# Patient Record
Sex: MALE | Employment: STUDENT | URBAN - METROPOLITAN AREA
[De-identification: names, ages, dates, MRNs, and addresses within clinical notes are randomized per-mention and may not be internally consistent; named-entity substitution may affect disease eponyms.]

---

## 2017-04-08 ENCOUNTER — HOSPITAL ENCOUNTER (EMERGENCY)
Facility: CLINIC | Age: 22
Discharge: HOME | End: 2017-04-08
Attending: FAMILY MEDICINE | Admitting: FAMILY MEDICINE
Payer: COMMERCIAL

## 2017-04-08 ENCOUNTER — TELEPHONE (OUTPATIENT)
Dept: NURSING | Facility: CLINIC | Age: 22
End: 2017-04-08

## 2017-04-08 ENCOUNTER — APPOINTMENT (OUTPATIENT)
Dept: GENERAL RADIOLOGY | Facility: CLINIC | Age: 22
End: 2017-04-08
Attending: FAMILY MEDICINE
Payer: COMMERCIAL

## 2017-04-08 VITALS
DIASTOLIC BLOOD PRESSURE: 69 MMHG | OXYGEN SATURATION: 100 % | HEIGHT: 72 IN | TEMPERATURE: 98.5 F | HEART RATE: 66 BPM | RESPIRATION RATE: 17 BRPM | SYSTOLIC BLOOD PRESSURE: 112 MMHG

## 2017-04-08 DIAGNOSIS — S61.258A CAT BITE OF MIDDLE FINGER, INITIAL ENCOUNTER: ICD-10-CM
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
## 2017-04-08 LAB
BASOPHILS # BLD AUTO: 0 10E9/L (ref 0–0.2)
BASOPHILS NFR BLD AUTO: 0.5 %
CRP SERPL-MCNC: 2.9 MG/L (ref 0–8)
DIFFERENTIAL METHOD BLD: NORMAL
EOSINOPHIL # BLD AUTO: 0.1 10E9/L (ref 0–0.7)
EOSINOPHIL NFR BLD AUTO: 1.2 %
ERYTHROCYTE [DISTWIDTH] IN BLOOD BY AUTOMATED COUNT: 12.2 % (ref 10–15)
HCT VFR BLD AUTO: 44.1 % (ref 40–53)
HGB BLD-MCNC: 15.2 G/DL (ref 13.3–17.7)
IMM GRANULOCYTES # BLD: 0 10E9/L (ref 0–0.4)
IMM GRANULOCYTES NFR BLD: 0.1 %
LYMPHOCYTES # BLD AUTO: 2.7 10E9/L (ref 0.8–5.3)
LYMPHOCYTES NFR BLD AUTO: 36.8 %
MCH RBC QN AUTO: 30.8 PG (ref 26.5–33)
MCHC RBC AUTO-ENTMCNC: 34.5 G/DL (ref 31.5–36.5)
MCV RBC AUTO: 90 FL (ref 78–100)
MONOCYTES # BLD AUTO: 0.6 10E9/L (ref 0–1.3)
MONOCYTES NFR BLD AUTO: 8.7 %
NEUTROPHILS # BLD AUTO: 3.9 10E9/L (ref 1.6–8.3)
NEUTROPHILS NFR BLD AUTO: 52.7 %
NRBC # BLD AUTO: 0 10*3/UL
NRBC BLD AUTO-RTO: 0 /100
PLATELET # BLD AUTO: 226 10E9/L (ref 150–450)
RBC # BLD AUTO: 4.93 10E12/L (ref 4.4–5.9)
WBC # BLD AUTO: 7.3 10E9/L (ref 4–11)

## 2017-04-08 PROCEDURE — 96365 THER/PROPH/DIAG IV INF INIT: CPT | Performed by: FAMILY MEDICINE

## 2017-04-08 PROCEDURE — 73140 X-RAY EXAM OF FINGER(S): CPT | Mod: LT

## 2017-04-08 PROCEDURE — 85025 COMPLETE CBC W/AUTO DIFF WBC: CPT | Performed by: FAMILY MEDICINE

## 2017-04-08 PROCEDURE — 99284 EMERGENCY DEPT VISIT MOD MDM: CPT | Mod: 25 | Performed by: FAMILY MEDICINE

## 2017-04-08 PROCEDURE — 25000125 ZZHC RX 250: Performed by: FAMILY MEDICINE

## 2017-04-08 PROCEDURE — 99284 EMERGENCY DEPT VISIT MOD MDM: CPT | Mod: Z6 | Performed by: FAMILY MEDICINE

## 2017-04-08 PROCEDURE — 86140 C-REACTIVE PROTEIN: CPT | Performed by: FAMILY MEDICINE

## 2017-04-08 RX ORDER — AMPICILLIN AND SULBACTAM 2; 1 G/1; G/1
3 INJECTION, POWDER, FOR SOLUTION INTRAMUSCULAR; INTRAVENOUS ONCE
Status: COMPLETED | OUTPATIENT
Start: 2017-04-08 | End: 2017-04-08

## 2017-04-08 RX ADMIN — AMPICILLIN SODIUM AND SULBACTAM SODIUM 3 G: 2; 1 INJECTION, POWDER, FOR SOLUTION INTRAMUSCULAR; INTRAVENOUS at 22:33

## 2017-04-08 ASSESSMENT — ENCOUNTER SYMPTOMS
WOUND: 1
FEVER: 0
SHORTNESS OF BREATH: 0

## 2017-04-08 NOTE — ED AVS SNAPSHOT
[facility], [place], Emergency Department    [address] [place] [id number]    Phone:  [phone]                                       Neli Culp   MRN: [id number]    Department:  [facility], Emergency Department   Date of Visit:  4/8/2017           After Visit Summary Signature Page     I have received my discharge instructions, and my questions have been answered. I have discussed any challenges I see with this plan with the nurse or doctor.    ..........................................................................................................................................  Patient/Patient Representative Signature      ..........................................................................................................................................  Patient Representative Print Name and Relationship to Patient    ..................................................               ................................................  Date                                            Time    ..........................................................................................................................................  Reviewed by Signature/Title    ...................................................              ..............................................  Date                                                            Time

## 2017-04-08 NOTE — ED AVS SNAPSHOT
[facility], Emergency Department    [address] [facility] [address]    Phone:  [phone]                                       Neil Culp   MRN: [id number]    Department:  [facility], Emergency Department   Date of Visit:  4/8/2017           Patient Information     Date Of Birth          1995        Your diagnoses for this visit were:     Cat bite of middle finger, initial encounter        You were seen by Kong Humphrey MD.      Follow-up Information     Follow up with Ambrose Byers MD.    Specialty:  Internal Medicine    Contact information:     PHYSICIANS  [address] [facility] [address]  [facility] [id number] [phone]          Discharge Instructions       Home.  Your xray and labs look OK.  Take the augmentin twice a day for 10 days for infection.  See MD for a recheck if not improving or return to the ER if increase swelling and pain or fever or any concerns.      Cat Bite    A cat bite can cause a wound deep enough to break the skin. In such cases, the wound is cleaned and then closed. Sometimes, the wound is not closed completely. This is so that fluid can drain if the wound becomes infected. In addition to wound care, a tetanus shot may be given, if needed.  Home Care    Wash your hands well with soap and warm water before and after caring for the wound. This helps lower the risk of infection.    Care for the wound as directed. If a dressing was applied to the wound, be sure to change it as directed.    If the wound bleeds, place a clean, soft cloth on the wound. Then firmly apply pressure until the bleeding stops. This may take up to 5 minutes. Do not release the pressure and look at the wound during this time.    Most wounds heal within 10 days. But an infection can occur even with proper treatment. So be sure to check the wound daily for signs of infection (see below).    Antibiotics may be prescribed. These help prevent or treat infection. If you re given  antibiotics, take them as directed. Also be sure to complete the medications.  Rabies Prevention   Rabies is a virus that can be carried in certain animals. These can include domestic animals such as cats and dogs. Pets fully vaccinated against rabies (2 shots) are at very low risk of infection. But because human rabies is almost always fatal, any biting pet should be confined for 10 days as an extra precaution. In general, if there is a risk for rabies, the following steps may need to be taken:    If someone s pet cat has bitten you, it should be kept in a secure area for the next 10 days to watch for signs of illness. (If the pet owner won t allow this, contact your local animal control center.) If the cat becomes ill or dies during that time, contact your local animal control center at once so the animal may be tested for rabies. If the cat stays healthy for the next 10 days, there is no danger of rabies in the animal or you.    If a stray cat bit you, contact your local animal control center. They can give information on capture, quarantine, and animal rabies testing.    If you can t locate the animal that bit you in the next 2 days, and if rabies exists in your region, you may need to receive the rabies vaccine series. Call your health care provider right away. Or, return to the emergency department promptly.    All animal bites should be reported to the local animal control center. If you were not given a form to fill out, you can report this yourself.  Follow-up care  Follow up with your health care provider, or as directed.  When to seek medical advice  Call your health care provider right away if any of these occur:    Signs of infection:    Spreading redness or warmth from the wound    Increased pain or swelling    Fever of 100.4 F (38 C) or higher, or as directed by your health care provider    Colored fluid or pus draining from the wound    Signs of rabies infection:    Headache    Confusion    Strange  behavior    Seizure    Decreased ability to move any body part near the bite area    Bleeding that cannot be stopped after 5 minutes of firm pressure              6297-7793 [employer]. [address] [place] [address], [place] [address]. All rights reserved. This information is not intended as a substitute for professional medical care. Always follow your healthcare professional's instructions.          24 Hour Appointment Hotline       To make an appointment at any [facility], call [phone] ([phone]). If you don't have a family doctor or clinic, we will help you find one. [facility] clinics are conveniently located to serve the needs of you and your family.             Review of your medicines      START taking        Dose / Directions Last dose taken    amoxicillin-clavulanate 875-125 MG per tablet   Commonly known as:  AUGMENTIN   Dose:  1 tablet   Quantity:  20 tablet        Take 1 tablet by mouth 2 times daily for 10 days   Refills:  0          Our records show that you are taking the medicines listed below. If these are incorrect, please call your family doctor or clinic.        Dose / Directions Last dose taken    clindamycin-benzoyl peroxide gel   Commonly known as:  BENZACLIN   Quantity:  50 g        Apply topically daily   Refills:  11        * tretinoin 0.025 % cream   Commonly known as:  RETIN-A   Quantity:  45 g        Apply to entire face at bedtime. Skip to every other night if too irritating.   Refills:  11        * tretinoin 0.05 % Gel   Dose:  0.5 inch   Quantity:  45 g        Apply 0.5 inches topically At Bedtime Spread a pea size amount into affected area.  Use sunscreen SPF>20.   Refills:  11        valACYclovir 500 MG tablet   Commonly known as:  VALTREX   Dose:  500 mg   Quantity:  90 tablet        Take 1 tablet (500 mg) by mouth daily   Refills:  3        * Notice:  This list has 2 medication(s) that are the same as other medications prescribed for you. Read the  directions carefully, and ask your doctor or other care provider to review them with you.            Prescriptions were sent or printed at these locations (1 Prescription)                   Other Prescriptions                Printed at Department/Unit printer (1 of 1)         amoxicillin-clavulanate (AUGMENTIN) 875-125 MG per tablet                Procedures and tests performed during your visit     CBC with platelets differential    CRP inflammation    Fingers XR, 2-3 views, left      Orders Needing Specimen Collection     None      Pending Results     Date and Time Order Name Status Description    4/8/2017 2211 Fingers XR, 2-3 views, left Preliminary             Pending Culture Results     No orders found from 4/6/2017 to 4/9/2017.            Thank you for choosing [facility]       Thank you for choosing [facility] for your care. Our goal is always to provide you with excellent care. Hearing back from our patients is one way we can continue to improve our services. Please take a few minutes to complete the written survey that you may receive in the mail after you visit with us. Thank you!        [employer] Information     [employer] gives you secure access to your electronic health record. If you see a primary care provider, you can also send messages to your care team and make appointments. If you have questions, please call your primary care clinic.  If you do not have a primary care provider, please call [phone] and they will assist you.        Care EveryWhere ID     This is your Care EveryWhere ID. This could be used by other organizations to access your [facility] medical records  [id number]        After Visit Summary       This is your record. Keep this with you and show to your community pharmacist(s) and doctor(s) at your next visit.

## 2017-04-09 ASSESSMENT — ENCOUNTER SYMPTOMS
NUMBNESS: 0
DYSPHORIC MOOD: 0
DECREASED CONCENTRATION: 0
CHILLS: 0
ACTIVITY CHANGE: 1
ABDOMINAL PAIN: 0
COLOR CHANGE: 0
CONFUSION: 0
NECK STIFFNESS: 0
ARTHRALGIAS: 0
WEAKNESS: 0
HEADACHES: 0
EYE REDNESS: 0
DIFFICULTY URINATING: 0

## 2017-04-09 NOTE — ED PROVIDER NOTES
History     Chief Complaint   Patient presents with     Cat Bite     Hand Pain     left hand middle finger     HPI  Neil Culp is a 22-year-old, right-handed male who presents for evaluation of finger swelling after cat bite. The patient reports that he was bit and scratched in the left hand middle finger by his parents' young cat about 1 week ago. He states that he had some swelling in the area of the wound, but this improved. However, he the swelling returned and has persisted, so he presents now for evaluation. He states that he did have intense pain in the area of the wound yesterday, but today his pain is significantly abated. No fevers or other symptoms associated. He reports that the cat's vaccines are up-to-date.  Denies fevers red streaking of the arm.  Patient still able to move the finger.  No numbness distally.  Patient tetanus is up-to-date.  cats immunizations are up-to-date also    I have reviewed the Medications, Allergies, Past Medical and Surgical History, and Social History in the Epic system.    No current facility-administered medications for this encounter.      Current Outpatient Prescriptions   Medication     amoxicillin-clavulanate (AUGMENTIN) 875-125 MG per tablet     clindamycin-benzoyl peroxide (BENZACLIN) gel     valACYclovir (VALTREX) 500 MG tablet     tretinoin 0.05 % GEL     tretinoin (RETIN-A) 0.025 % cream     History reviewed. No pertinent past medical history.    Past Surgical History:   Procedure Laterality Date     wisdom teeth          Family History   Problem Relation Age of Onset     Prostate Cancer Maternal Grandfather      Breast Cancer Mother      DIABETES Maternal Grandmother      Hypertension Maternal Grandmother      Psychotic Disorder Father      Bipolar     Retinal detachment Brother      possible     Glaucoma No family hx of      Macular Degeneration No family hx of      CANCER No family hx of      No family history of skin cancer     Melanoma  No family hx of      Skin Cancer No family hx of        Social History   Substance Use Topics     Smoking status: Never Smoker     Smokeless tobacco: Never Used     Alcohol use No      No Known Allergies    Review of Systems   Constitutional: Positive for activity change (slightly decreased range of motion left middle finger patient right hand dominant). Negative for chills and fever.   HENT: Negative for congestion.    Eyes: Negative for redness.   Respiratory: Negative for shortness of breath.    Cardiovascular: Negative for chest pain.   Gastrointestinal: Negative for abdominal pain.   Genitourinary: Negative for difficulty urinating.   Musculoskeletal: Negative for arthralgias and neck stiffness.   Skin: Positive for rash and wound (cat bite and scratch to left hand middle finger). Negative for color change.        Left middle finger with some mild swelling noted with minimal hyperemia and no lymphogenic spread no crepitus noted.  No active findings for acute flexor teno- synovitis.   Neurological: Negative for weakness, numbness and headaches.   Psychiatric/Behavioral: Negative for confusion, decreased concentration and dysphoric mood.   All other systems reviewed and are negative.      Physical Exam   BP: 113/70  Heart Rate: 66  Temp: 98.5  F (36.9  C)  Resp: 17  Height: 182.9 cm (6')  SpO2: 98 %  Physical Exam   Constitutional: He is oriented to person, place, and time. He appears well-developed and well-nourished. He appears distressed.   Minimal distress nontoxic   HENT:   Head: Normocephalic and atraumatic.   Eyes: Pupils are equal, round, and reactive to light. No scleral icterus.   Neck: Normal range of motion. Neck supple.   Cardiovascular: Regular rhythm.    Pulmonary/Chest: No stridor. No respiratory distress.   Abdominal: There is no guarding.   Musculoskeletal: He exhibits edema and tenderness. He exhibits no deformity.        Hands:  Patient's left hand there is some mild swelling without  lymphangitic spread or marked hyperemia no desquamation noted.  Patient has minimal pain with passive extension patient is able to fully flex the fist/fingers without difficulty.   Neurological: He is alert and oriented to person, place, and time. He has normal reflexes. No cranial nerve deficit. Coordination normal.   Skin: Skin is warm and dry. No rash noted. He is not diaphoretic. No erythema. No pallor.   Psychiatric: He has a normal mood and affect. His behavior is normal. Judgment and thought content normal.   Nursing note and vitals reviewed.      ED Course     ED Course     Procedures         In ER patient labs done CBC CMP which were normal.  3 g Unasyn IV given.  X-rays done of the left middle finger reveal no foreign body some soft tissue swelling but no air in the subcutaneous tissue.    Reassess patient patient is nontoxic otherwise cooperative was to go home patient is discharged on Augmentin for 10 days monitoring signs of worsening swelling fever should return immediately patient agrees plan at discharge.    Critical Care time:  none               Labs Ordered and Resulted from Time of ED Arrival Up to the Time of Departure from the ED   CBC WITH PLATELETS DIFFERENTIAL   CRP INFLAMMATION     Results for orders placed or performed during the hospital encounter of 04/08/17   Fingers XR, 2-3 views, left    Narrative         Impression    Impression: Soft tissue swelling about the left third digit. No  radiodense foreign body.   CBC with platelets differential   Result Value Ref Range    WBC 7.3 4.0 - 11.0 10e9/L    RBC Count 4.93 4.4 - 5.9 10e12/L    Hemoglobin 15.2 13.3 - 17.7 g/dL    Hematocrit 44.1 40.0 - 53.0 %    MCV 90 78 - 100 fl    MCH 30.8 26.5 - 33.0 pg    MCHC 34.5 31.5 - 36.5 g/dL    RDW 12.2 10.0 - 15.0 %    Platelet Count 226 150 - 450 10e9/L    Diff Method Automated Method     % Neutrophils 52.7 %    % Lymphocytes 36.8 %    % Monocytes 8.7 %    % Eosinophils 1.2 %    % Basophils 0.5 %     % Immature Granulocytes 0.1 %    Nucleated RBCs 0 0 /100    Absolute Neutrophil 3.9 1.6 - 8.3 10e9/L    Absolute Lymphocytes 2.7 0.8 - 5.3 10e9/L    Absolute Monocytes 0.6 0.0 - 1.3 10e9/L    Absolute Eosinophils 0.1 0.0 - 0.7 10e9/L    Absolute Basophils 0.0 0.0 - 0.2 10e9/L    Abs Immature Granulocytes 0.0 0 - 0.4 10e9/L    Absolute Nucleated RBC 0.0    CRP inflammation   Result Value Ref Range    CRP Inflammation 2.9 0.0 - 8.0 mg/L       Assessments & Plan (with Medical Decision Making)  22-year-old male with left middle finger cat bite a week ago now some increasing swelling noted.  Mild swelling that is decreased since last night no fever labs are normal x-ray negative for foreign body or subcu air.  Patient has no obvious signs of active flexor tenosynovitis.  Patient treated with Unasyn IV discharged on Augmentin          I have reviewed the nursing notes.    I have reviewed the findings, diagnosis, plan and need for follow up with the patient.    Discharge Medication List as of 4/8/2017 11:44 PM      START taking these medications    Details   amoxicillin-clavulanate (AUGMENTIN) 875-125 MG per tablet Take 1 tablet by mouth 2 times daily for 10 days, Disp-20 tablet, R-0, Local Print             Final diagnoses:   Cat bite of middle finger, initial encounter     I, Evgeny Ji, am serving as a trained medical scribe to document services personally performed by Kong Humphrey MD, based on the provider's statements to me.      I, Kong Humphrey MD, was physically present and have reviewed and verified the accuracy of this note documented by Evgeny Ji.    4/8/2017   [facility] EMERGENCY DEPARTMENT    This note was created at least in part by the use of dragon voice dictation system. Inadvertent typographical errors may still exist.  Kong Humphrey MD.         Kong Humphrey MD  04/09/17 0026

## 2017-04-09 NOTE — ED NOTES
Cat scratch to left hand middle finger on 4/1/2017. Finger got swollen and then got better and started to swell again on 4/6. Cat is the patient's pet and has had rabies vaccines.

## 2017-04-09 NOTE — DISCHARGE INSTRUCTIONS
Home.  Your xray and labs look OK.  Take the augmentin twice a day for 10 days for infection.  See MD for a recheck if not improving or return to the ER if increase swelling and pain or fever or any concerns.      Cat Bite    A cat bite can cause a wound deep enough to break the skin. In such cases, the wound is cleaned and then closed. Sometimes, the wound is not closed completely. This is so that fluid can drain if the wound becomes infected. In addition to wound care, a tetanus shot may be given, if needed.  Home Care    Wash your hands well with soap and warm water before and after caring for the wound. This helps lower the risk of infection.    Care for the wound as directed. If a dressing was applied to the wound, be sure to change it as directed.    If the wound bleeds, place a clean, soft cloth on the wound. Then firmly apply pressure until the bleeding stops. This may take up to 5 minutes. Do not release the pressure and look at the wound during this time.    Most wounds heal within 10 days. But an infection can occur even with proper treatment. So be sure to check the wound daily for signs of infection (see below).    Antibiotics may be prescribed. These help prevent or treat infection. If you re given antibiotics, take them as directed. Also be sure to complete the medications.  Rabies Prevention   Rabies is a virus that can be carried in certain animals. These can include domestic animals such as cats and dogs. Pets fully vaccinated against rabies (2 shots) are at very low risk of infection. But because human rabies is almost always fatal, any biting pet should be confined for 10 days as an extra precaution. In general, if there is a risk for rabies, the following steps may need to be taken:    If someone s pet cat has bitten you, it should be kept in a secure area for the next 10 days to watch for signs of illness. (If the pet owner won t allow this, contact your local animal control center.) If the cat  becomes ill or dies during that time, contact your local animal control center at once so the animal may be tested for rabies. If the cat stays healthy for the next 10 days, there is no danger of rabies in the animal or you.    If a stray cat bit you, contact your local animal control center. They can give information on capture, quarantine, and animal rabies testing.    If you can t locate the animal that bit you in the next 2 days, and if rabies exists in your region, you may need to receive the rabies vaccine series. Call your health care provider right away. Or, return to the emergency department promptly.    All animal bites should be reported to the local animal control center. If you were not given a form to fill out, you can report this yourself.  Follow-up care  Follow up with your health care provider, or as directed.  When to seek medical advice  Call your health care provider right away if any of these occur:    Signs of infection:    Spreading redness or warmth from the wound    Increased pain or swelling    Fever of 100.4 F (38 C) or higher, or as directed by your health care provider    Colored fluid or pus draining from the wound    Signs of rabies infection:    Headache    Confusion    Strange behavior    Seizure    Decreased ability to move any body part near the bite area    Bleeding that cannot be stopped after 5 minutes of firm pressure              3684-8362 [employer]. [address] [place] [address], [place] [address]. All rights reserved. This information is not intended as a substitute for professional medical care. Always follow your healthcare professional's instructions.

## 2017-04-09 NOTE — TELEPHONE ENCOUNTER
"Call Type: Triage Call    Presenting Problem: \" My son was scratched my our cat last week, and  last night he called and said his finger is swollen, and painful,  and he could not sleep . \"  Triage Note:  Guideline Title: Animal Bite (Pediatric)  Recommended Disposition: See Provider within 4 hours  Original Inclination: Did not know what to do  Override Disposition:  Intended Action: Go to Urgent Care Center  Physician Contacted: No  Hand bite or puncture that breaks the skin (Exception: Tiny puncture from small  pet such as gerbil, puppy or turtle OR any scratches) ?  YES  Sounds like a life-threatening emergency to the triager ? NO  [1] Major bleeding (e.g., actively dripping or spurting) AND [2] can't be stopped  ? NO  [1] Large blood loss AND [2] fainted or too weak to stand ? NO  Description of bite sounds severe to the triager ? NO  [1] Bleeding AND [2] won't stop after 10 minutes of direct pressure (using correct  technique) ? NO  [1] Monkey AND [2] any cut, puncture or scratch ? NO  [1] PET animal (dog or cat) at risk for RABIES (e.g., sick, stray, unprovoked  bite, developing country) AND [2] any cut, puncture or scratch ? NO  Fish bite (e.g., shark, moray eel) ? NO  Human bite ? NO  Snake bite ? NO  [1] Infected animal or human bite AND [2] taking an antibiotic ? NO  Face or neck bite or puncture that breaks the skin (Exception: Tiny puncture from  small pet such as gerbil or puppy OR any scratches) ? NO  [1] Puncture wound (hole through the skin) AND [2] from a cat bite (or deep claw  puncture wound) ? NO  [1] Cut or tear AND [2] [2] large enough to be irrigated (1/8 inch or 3 mm) AND  [3] any animal (Exception: superficial scratches that don't go through the dermis  or small puncture wounds) ? NO  [1] WILD animal at risk for RABIES AND [2] any cut, puncture or scratch ? NO  Physician Instructions:  Care Advice: SEE PHYSICIAN WITHIN 4 HOURS: * IF OFFICE WILL BE OPEN: Your  child needs to be seen within " the next 3 or 4 hours. Call your doctor's  office as soon as it opens. * IF OFFICE WILL BE CLOSED: Your child needs to  be seen within the next 3 or 4 hours. A nearby Urgent Care Center is often  a good source of care. Another choice is to go to the ER. Go sooner if your  child becomes worse.

## 2017-07-01 ENCOUNTER — HEALTH MAINTENANCE LETTER (OUTPATIENT)
Age: 22
End: 2017-07-01

## 2017-07-22 ENCOUNTER — HEALTH MAINTENANCE LETTER (OUTPATIENT)
Age: 22
End: 2017-07-22

## 2017-09-01 ENCOUNTER — OFFICE VISIT (OUTPATIENT)
Dept: INTERNAL MEDICINE | Facility: CLINIC | Age: 22
End: 2017-09-01

## 2017-09-01 VITALS
WEIGHT: 151 LBS | HEART RATE: 62 BPM | RESPIRATION RATE: 18 BRPM | DIASTOLIC BLOOD PRESSURE: 72 MMHG | BODY MASS INDEX: 20.48 KG/M2 | TEMPERATURE: 98.3 F | SYSTOLIC BLOOD PRESSURE: 111 MMHG

## 2017-09-01 DIAGNOSIS — R31.9 HEMATURIA: Primary | ICD-10-CM

## 2017-09-01 DIAGNOSIS — Z23 NEED FOR PROPHYLACTIC VACCINATION WITH TETANUS-DIPHTHERIA (TD): ICD-10-CM

## 2017-09-01 DIAGNOSIS — R31.9 HEMATURIA: ICD-10-CM

## 2017-09-01 DIAGNOSIS — Z23 NEED FOR HPV VACCINE: ICD-10-CM

## 2017-09-01 LAB
ALBUMIN SERPL-MCNC: 4.3 G/DL (ref 3.4–5)
ALBUMIN UR-MCNC: 30 MG/DL
ALP SERPL-CCNC: 74 U/L (ref 40–150)
ALT SERPL W P-5'-P-CCNC: 19 U/L (ref 0–70)
ANION GAP SERPL CALCULATED.3IONS-SCNC: 6 MMOL/L (ref 3–14)
APPEARANCE UR: ABNORMAL
AST SERPL W P-5'-P-CCNC: 16 U/L (ref 0–45)
BILIRUB SERPL-MCNC: 2.2 MG/DL (ref 0.2–1.3)
BILIRUB UR QL STRIP: NEGATIVE
BUN SERPL-MCNC: 14 MG/DL (ref 7–30)
CALCIUM SERPL-MCNC: 8.5 MG/DL (ref 8.5–10.1)
CHLORIDE SERPL-SCNC: 102 MMOL/L (ref 94–109)
CO2 SERPL-SCNC: 28 MMOL/L (ref 20–32)
COLOR UR AUTO: ABNORMAL
CREAT SERPL-MCNC: 1.22 MG/DL (ref 0.66–1.25)
GFR SERPL CREATININE-BSD FRML MDRD: 75 ML/MIN/1.7M2
GLUCOSE SERPL-MCNC: 83 MG/DL (ref 70–99)
GLUCOSE UR STRIP-MCNC: NEGATIVE MG/DL
HGB UR QL STRIP: ABNORMAL
KETONES UR STRIP-MCNC: NEGATIVE MG/DL
LEUKOCYTE ESTERASE UR QL STRIP: NEGATIVE
MUCOUS THREADS #/AREA URNS LPF: PRESENT /LPF
NITRATE UR QL: NEGATIVE
PH UR STRIP: 5 PH (ref 5–7)
POTASSIUM SERPL-SCNC: 3.9 MMOL/L (ref 3.4–5.3)
PROT SERPL-MCNC: 7.8 G/DL (ref 6.8–8.8)
RBC #/AREA URNS AUTO: >182 /HPF (ref 0–2)
SODIUM SERPL-SCNC: 136 MMOL/L (ref 133–144)
SOURCE: ABNORMAL
SP GR UR STRIP: 1.03 (ref 1–1.03)
SQUAMOUS #/AREA URNS AUTO: 1 /HPF (ref 0–1)
UROBILINOGEN UR STRIP-MCNC: 0 MG/DL (ref 0–2)
WBC #/AREA URNS AUTO: 0 /HPF (ref 0–2)
YEAST #/AREA URNS HPF: ABNORMAL /HPF

## 2017-09-01 ASSESSMENT — PAIN SCALES - GENERAL: PAINLEVEL: NO PAIN (0)

## 2017-09-01 NOTE — MR AVS SNAPSHOT
After Visit Summary   9/1/2017    Neil Culp    MRN: [id number]           Patient Information     Date Of Birth          1995        Visit Information        Provider Department      9/1/2017 7:50 AM Tawnya Murillo MD [facility]        Today's Diagnoses     Hematuria    -  1    Need for prophylactic vaccination with tetanus-diphtheria (TD)        Need for HPV vaccine          Care Instructions    Primary Care Center Medication Refill Request Information:  * Please contact your pharmacy regarding ANY request for medication refills.  ** [facility] Prescription Fax = [phone]  * Please allow 3 business days for routine medication refills.  * Please allow 5 business days for controlled substance medication refills.     Primary Care Center Test Result notification information:  *You will be notified with in 7-10 days of your appointment day regarding the results of your test.  If you are on MyChart you will be notified as soon as the provider has reviewed the results and signed off on them.    Primary Care Center [phone]             Follow-ups after your visit        Your next 10 appointments already scheduled     Sep 01, 2017  8:30 AM CDT   LAB with  LAB   [facility] ([facility])    [address] [place] [address] [phone]           Patient must bring picture ID. Patient should be prepared to give a urine specimen  Please do not eat 10-12 hours before your appointment if you are coming in fasting for labs on lipids, cholesterol, or glucose (sugar). Pregnant women should follow their Care Team instructions. Water with medications is okay. Do not drink coffee or other fluids. If you have concerns about taking  your medications, please ask at office or if scheduling via [employer], send a message by clicking on Secure Messaging, Message Your Care Team.            Sep 01, 2017  8:45 AM CDT   XR  ABDOMEN 1 VIEW with UCXR1   [facility] ([facility])    [address]  1st Floor  [facility] [address] [phone]           Please bring a list of your current medicines to your exam. (Include vitamins, minerals and over-thecounter medicines.) Leave your valuables at home.  Tell your doctor if there is a chance you may be pregnant.  You do not need to do anything special for this exam.              Future tests that were ordered for you today     Open Future Orders        Priority Expected Expires Ordered    Comprehensive metabolic panel Routine 9/1/2017 9/15/2017 9/1/2017    XR Abdomen 1 View Routine 9/1/2017 9/1/2018 9/1/2017    UA with Micro reflex to Culture Routine 9/1/2017 9/1/2018 9/1/2017            Who to contact     Please call your clinic at [phone] to:    Ask questions about your health    Make or cancel appointments    Discuss your medicines    Learn about your test results    Speak to your doctor   If you have compliments or concerns about an experience at your clinic, or if you wish to file a complaint, please contact [facility] Patient Relations at [phone] or email us at [email or web address]         Additional Information About Your Visit        [employer] Information     [employer] gives you secure access to your electronic health record. If you see a primary care provider, you can also send messages to your care team and make appointments. If you have questions, please call your primary care clinic.  If you do not have a primary care provider, please call [phone] and they will assist you.      [employer] is an electronic gateway that provides easy, online access to your medical records. With [employer], you can request a clinic appointment, read your test results, renew a prescription or communicate with your care team.     To access your existing account, please contact your [facility] or call [phone] for assistance.        Care EveryWhere ID     This is your Care EveryWhere ID. This could be used by other organizations to access your [facility] medical records  [id number]        Your Vitals Were     Pulse Temperature Respirations BMI (Body Mass Index)          62 98.3  F (36.8  C) (Oral) 18 20.48 kg/m2         Blood Pressure from Last 3 Encounters:   09/01/17 111/72   04/08/17 112/69   09/17/15 99/62    Weight from Last 3 Encounters:   09/01/17 68.5 kg (151 lb)   09/17/15 66.6 kg (146 lb 14.4 oz) (37 %)*   08/31/15 65.8 kg (145 lb) (34 %)*     * Growth percentiles are based on [facility] 2-20 Years data.              We Performed the Following     C HUMAN PAPILLOMA VIRUS VACCINE (GARDASIL 9) 3 DOSE IM     TDAP ( BOOSTRIX AGES 10-64)        Primary Care Provider Office Phone # Fax #    Ambrose Byers [phone]       [address] [place] [address] [address] [place] [id number]        Equal Access to Services     [facility]: Hadii cristofer ku hadchinoo Sokaylen, waaxda lumaeve, qaybta kaalmada armen, henrry will . So [facility] [phone].    ATENCIÓN: Si habla español, tiene a silva disposición servicios gratuitos de asistencia lingüística. Llame al [phone].    We comply with applicable federal civil rights laws and [place] laws. We do not discriminate on the basis of race, color, national origin, age, disability sex, sexual orientation or gender identity.            Thank you!     Thank you for choosing [facility]  for your care. Our goal is always to provide you with excellent care. Hearing back from our patients is one way we can continue to improve our services. Please take a few minutes to complete the written survey that you may receive in the mail after your visit with us. Thank you!             Your Updated Medication List - Protect others around you: Learn how to safely use, store and throw away your medicines at  www.disposemymeds.org.          This list is accurate as of: 9/1/17  8:19 AM.  Always use your most recent med list.                   Brand Name Dispense Instructions for use Diagnosis    clindamycin-benzoyl peroxide gel    BENZACLIN    50 g    Apply topically daily    Acne vulgaris       * tretinoin 0.025 % cream    RETIN-A    45 g    Apply to entire face at bedtime. Skip to every other night if too irritating.    Nodular acne       * tretinoin 0.05 % Gel     45 g    Apply 0.5 inches topically At Bedtime Spread a pea size amount into affected area.  Use sunscreen SPF>20.    Acne vulgaris       valACYclovir 500 MG tablet    VALTREX    90 tablet    Take 1 tablet (500 mg) by mouth daily    Herpes labialis       * Notice:  This list has 2 medication(s) that are the same as other medications prescribed for you. Read the directions carefully, and ask your doctor or other care provider to review them with you.

## 2017-09-01 NOTE — PATIENT INSTRUCTIONS
[facility] Medication Refill Request Information:  * Please contact your pharmacy regarding ANY request for medication refills.  ** [facility] Prescription Fax = [phone]  * Please allow 3 business days for routine medication refills.  * Please allow 5 business days for controlled substance medication refills.     [facility] Test Result notification information:  *You will be notified with in 7-10 days of your appointment day regarding the results of your test.  If you are on MyChart you will be notified as soon as the provider has reviewed the results and signed off on them.    [facility] [phone]

## 2017-09-01 NOTE — NURSING NOTE
Chief Complaint   Patient presents with     Urinary Problem     Patient is here to follow up with UTI     Alisia Dean CMA 7:47 AM on 9/1/2017.

## 2017-09-07 ENCOUNTER — TELEPHONE (OUTPATIENT)
Dept: INTERNAL MEDICINE | Facility: CLINIC | Age: 22
End: 2017-09-07

## 2017-09-07 DIAGNOSIS — R31.9 HEMATURIA: Primary | ICD-10-CM

## 2017-09-07 NOTE — TELEPHONE ENCOUNTER
Reached pt by cell. I asked if I could give results via phone. He stated he would check results via [employer].    Veronika Mart RN

## 2017-09-08 NOTE — PROGRESS NOTES
Neil is a 22 year old male who was seen today for urinary problem.  He had visible blood in the urine and reported to an outside urgent care and was told he had a UTI.  He finished a course of antibiotics.  He denies fever, back pain, or dysuria.   He is otherwise healthy, has no past medical history.  He denies any testicular pain or penile discharge.  He is interested in completing the HPV series.    On exam  B/P: 111/72, T: 98.3, P: 62, R: 18  Cor RRR  Lungs CTA  Abd +BS, NT, no CVA tenderness      A/P    Hematuria  -     Comprehensive metabolic panel; Future  -     XR Abdomen 1 View; Future  -     UA with Micro reflex to Culture; Future--this showed many RBC but no signs of infection  -     UROLOGY ADULT REFERRAL    Need for prophylactic vaccination with tetanus-diphtheria (TD)  -     TDAP ( BOOSTRIX AGES 10-64)    Need for HPV vaccine  -     C HUMAN PAPILLOMA VIRUS VACCINE (GARDASIL 9) 3 DOSE IM    Jose Ortega MD

## 2017-09-14 ENCOUNTER — PRE VISIT (OUTPATIENT)
Dept: UROLOGY | Facility: CLINIC | Age: 22
End: 2017-09-14

## 2017-09-14 NOTE — TELEPHONE ENCOUNTER
PREVISIT INFORMATION                                                    Neil Culp scheduled for future visit at [facility] specialty clinics.    Patient is scheduled to see Lahti on 09/15  Reason for visit: hematuria   Referring provider: Tawnya Murillo MD  Has patient seen previous specialist?   Medical Records:  Left message to return call to clinic     REVIEW                                                      New patient packet mailed to patient: No  Medication reconciliation complete: No      PLAN/FOLLOW-UP NEEDED                                                      Patient Reminders Given:    Informed patient to bring an updated list of allergies, medications, pharmacy details and insurance information. Directed patient to come to the 2nd floor, check-in #4 for their appointment. Informed patient to call back if appointment needs to be cancelled or rescheduled at [phone].    Reminded patient to bring any outside records regarding this appointment or have them faxed to clinic at [phone].    Reminded patient to complete and bring in urology questionnaire. Also for patient to please come with a full bladder and to ask , if early to get staff member for sample.

## 2017-09-15 ENCOUNTER — OFFICE VISIT (OUTPATIENT)
Dept: UROLOGY | Facility: CLINIC | Age: 22
End: 2017-09-15
Payer: COMMERCIAL

## 2017-09-15 VITALS — HEART RATE: 60 BPM | SYSTOLIC BLOOD PRESSURE: 101 MMHG | DIASTOLIC BLOOD PRESSURE: 60 MMHG | OXYGEN SATURATION: 100 %

## 2017-09-15 DIAGNOSIS — R17 SERUM TOTAL BILIRUBIN ELEVATED: ICD-10-CM

## 2017-09-15 DIAGNOSIS — R31.0 GROSS HEMATURIA: Primary | ICD-10-CM

## 2017-09-15 LAB
ALBUMIN SERPL-MCNC: 4 G/DL (ref 3.4–5)
ALBUMIN UR-MCNC: 100 MG/DL
ALP SERPL-CCNC: 71 U/L (ref 40–150)
ALT SERPL W P-5'-P-CCNC: 19 U/L (ref 0–70)
ANION GAP SERPL CALCULATED.3IONS-SCNC: 2 MMOL/L (ref 3–14)
APPEARANCE UR: CLEAR
AST SERPL W P-5'-P-CCNC: 16 U/L (ref 0–45)
BACTERIA #/AREA URNS HPF: ABNORMAL /HPF
BILIRUB SERPL-MCNC: 2.4 MG/DL (ref 0.2–1.3)
BILIRUB UR QL STRIP: NEGATIVE
BUN SERPL-MCNC: 13 MG/DL (ref 7–30)
CALCIUM SERPL-MCNC: 8.3 MG/DL (ref 8.5–10.1)
CHLORIDE SERPL-SCNC: 107 MMOL/L (ref 94–109)
CO2 SERPL-SCNC: 30 MMOL/L (ref 20–32)
COLOR UR AUTO: YELLOW
CREAT SERPL-MCNC: 0.98 MG/DL (ref 0.66–1.25)
GFR SERPL CREATININE-BSD FRML MDRD: >90 ML/MIN/1.7M2
GLUCOSE SERPL-MCNC: 82 MG/DL (ref 70–99)
GLUCOSE UR STRIP-MCNC: NEGATIVE MG/DL
HGB UR QL STRIP: NEGATIVE
KETONES UR STRIP-MCNC: NEGATIVE MG/DL
LEUKOCYTE ESTERASE UR QL STRIP: NEGATIVE
MUCOUS THREADS #/AREA URNS LPF: PRESENT /LPF
NITRATE UR QL: NEGATIVE
NON-SQ EPI CELLS #/AREA URNS LPF: ABNORMAL /LPF
PH UR STRIP: 6 PH (ref 5–7)
POTASSIUM SERPL-SCNC: 3.8 MMOL/L (ref 3.4–5.3)
PROT SERPL-MCNC: 7.2 G/DL (ref 6.8–8.8)
RBC #/AREA URNS AUTO: ABNORMAL /HPF
SODIUM SERPL-SCNC: 139 MMOL/L (ref 133–144)
SOURCE: ABNORMAL
SP GR UR STRIP: 1.02 (ref 1–1.03)
UROBILINOGEN UR STRIP-MCNC: 4 MG/DL (ref 0–2)
WBC #/AREA URNS AUTO: ABNORMAL /HPF

## 2017-09-15 PROCEDURE — 87086 URINE CULTURE/COLONY COUNT: CPT | Performed by: PHYSICIAN ASSISTANT

## 2017-09-15 PROCEDURE — 99203 OFFICE O/P NEW LOW 30 MIN: CPT | Performed by: PHYSICIAN ASSISTANT

## 2017-09-15 PROCEDURE — 36415 COLL VENOUS BLD VENIPUNCTURE: CPT | Performed by: PHYSICIAN ASSISTANT

## 2017-09-15 PROCEDURE — 81001 URINALYSIS AUTO W/SCOPE: CPT | Performed by: PHYSICIAN ASSISTANT

## 2017-09-15 PROCEDURE — 88112 CYTOPATH CELL ENHANCE TECH: CPT | Performed by: PHYSICIAN ASSISTANT

## 2017-09-15 PROCEDURE — 80053 COMPREHEN METABOLIC PANEL: CPT | Performed by: PHYSICIAN ASSISTANT

## 2017-09-15 ASSESSMENT — PAIN SCALES - GENERAL: PAINLEVEL: NO PAIN (0)

## 2017-09-15 NOTE — PROGRESS NOTES
CC: gross hematuria.    HPI: It is a pleasure to see Mr. Neil Culp, a very pleasant 22 year old male seen today in the urology clinic in consultation from Dr. Jordan Garcia for evaluation of gross hematuria. Patient reports he first saw blood in his urine around August 14. He was traveling in [place] at the time - presented to urgent care where UA was performed and he was started on a week long course of antibiotics. Per patient, the urinalysis did not show strong evidence for infection (no results available to review). The blood in his urine then resolved for a few weeks but then recurred a few weeks ago. Describes his urine as bright red with no clots. This was painless - he denies dysuria, abdominal/back pain, fevers, chills. Also denies urinary frequency, urgency, or incontinence. Some urinary hesitancy yesterday but this was an isolated episode and he has been voiding fine today without issue. He was seen through primary care clinic on 9/1/17 where UA revealed large blood, 30 protein, >182 RBC, moderate yeast, and mucous (no culture). CMP revealed elevated bilirubin of 2.2, normal alk phos and transaminases. Per review of chart, bilirubin has been consistently elevated over the past few years. Patient denies yellow skin/eyes or history of hepatic or other GI disorders.     Currently, patient continues to deny urinary symptoms. He states his urine is dark but no obvious blood for the past several days to weeks. No concern for STD's. No history of kidney stones or UTI's.     Review of Diagnostics:  9/1/17 - UA: large blood, 30 protein, >182 RBC, moderate yeast, and mucous (no culture)  9/1/17 - CMP: normal aside from bilirubin elevated to 2.2    XR ABDOMEN 1 VW 9/1/2017   Findings:  The small intestine and colon are not distended. Air is  visualized within the colon. There are no abnormal calcifications.  There are no abnormal soft tissue densities. There is no free air. No  evidence of  pneumatosis. No portal venous gas. The lung bases are  unremarkable.    Hematuria Risk Factors:  Age >40: no  Smoking history: no  Occupational exposure to chemicals or dyes (ie, benzenes, aromatic amines): no  History of urologic disorder or disease: no  History of irritative voiding symptoms: no  History of urinary tract infection: no  Analgesic abuse: no  History of pelvic irradiation: no    No past medical history on file.  Past Surgical History:   Procedure Laterality Date     wisdom teeth        Current Outpatient Prescriptions   Medication Sig Dispense Refill     valACYclovir (VALTREX) 500 MG tablet Take 1 tablet (500 mg) by mouth daily 90 tablet 3     clindamycin-benzoyl peroxide (BENZACLIN) gel Apply topically daily 50 g 11     No Known Allergies  FAMILY HISTORY: Prostate cancer in maternal grandfather.  There is no history of urolithiasis.      SOCIAL HISTORY: The patient does not smoke cigarettes, no EtOH and no illicit drug use.    ROS: A comprehensive 14 point ROS was obtained and was negative except for that outlined above in the HPI.    PHYSICAL EXAM:   Vitals:    09/15/17 0858   BP: 101/60   BP Location: Left arm   Patient Position: Chair   Cuff Size: Adult Regular   Pulse: 60   SpO2: 100%     GENERAL: Well groomed, well developed, well nourished male in NAD.  HEENT: AT, NC, EOMI bilaterally.  SKIN: Warm to touch, dry.  No visible rashes or lesions on examined areas.  RESP: No increased respiratory effort.  MS: Full ROM in extremities.  : Deferred for now.  ABDULAZIZ: Deferred for now.  NEURO: Alert and oriented x 3.  PSYCH: Normal mood and affect, pleasant and agreeable during interview and exam.    LABS: UA today reveals 100 protein, 4.0 urobilinogen, 2-5 WBC, 0-2 RBC, few bacteria, mucous, o/w wnl    Component      Latest Ref Rng & Units 8/31/2015 9/30/2015 10/28/2015 12/2/2015   Sodium      133 - 144 mmol/L 138 137 139 143   Potassium      3.4 - 5.3 mmol/L 4.0 4.0 4.0 3.8   Chloride      94 - 109  mmol/L 107 104 105 108   Carbon Dioxide      20 - 32 mmol/L 26 27 29 28   Anion Gap      3 - 14 mmol/L 5 6 5 8   Glucose      70 - 99 mg/dL 87 92 74 80   Urea Nitrogen      7 - 30 mg/dL 14 13 14 14   Creatinine      0.66 - 1.25 mg/dL 1.04 (H) 0.80 0.95 0.93   GFR Estimate      >60 mL/min/1.7m2 >90 . . . >90 . . . >90 . . . >90 . . .   GFR Estimate If Black      >60 mL/min/1.7m2 >90 . . . >90 . . . >90 . . . >90 . . .   Calcium      8.5 - 10.1 mg/dL 8.9 8.6 8.5 8.2 (L)   Bilirubin Total      0.2 - 1.3 mg/dL 1.9 (H) 1.9 (H) 1.8 (H) 1.6 (H)   Albumin      3.4 - 5.0 g/dL 4.4 4.2 4.1 4.1   Protein Total      6.8 - 8.8 g/dL 7.6 7.4 7.5 7.3   Alkaline Phosphatase      40 - 150 U/L 100 90 96 102   ALT      0 - 70 U/L 27 24 25 27   AST      0 - 45 U/L 19 17 18 16     Component      Latest Ref Rng & Units 2/5/2016 9/1/2017 9/15/2017   Sodium      133 - 144 mmol/L 141 136 139   Potassium      3.4 - 5.3 mmol/L 4.0 3.9 3.8   Chloride      94 - 109 mmol/L 107 102 107   Carbon Dioxide      20 - 32 mmol/L 27 28 30   Anion Gap      3 - 14 mmol/L 7 6 2 (L)   Glucose      70 - 99 mg/dL 82 83 82   Urea Nitrogen      7 - 30 mg/dL 15 14 13   Creatinine      0.66 - 1.25 mg/dL 1.00 1.22 0.98   GFR Estimate      >60 mL/min/1.7m2 >90 . . . 75 >90   GFR Estimate If Black      >60 mL/min/1.7m2 >90 . . . >90 >90   Calcium      8.5 - 10.1 mg/dL 8.6 8.5 8.3 (L)   Bilirubin Total      0.2 - 1.3 mg/dL 2.3 (H) 2.2 (H) 2.4 (H)   Albumin      3.4 - 5.0 g/dL 4.2 4.3 4.0   Protein Total      6.8 - 8.8 g/dL 7.7 7.8 7.2   Alkaline Phosphatase      40 - 150 U/L 90 74 71   ALT      0 - 70 U/L 26 19 19   AST      0 - 45 U/L 16 16 16       IMAGING:   XR ABDOMEN 1 VW 9/1/2017   Findings:  The small intestine and colon are not distended. Air is  visualized within the colon. There are no abnormal calcifications.  There are no abnormal soft tissue densities. There is no free air. No  evidence of pneumatosis. No portal venous gas. The lung bases  are  unremarkable.      ASSESSMENT and PLAN:    Mr. Neil Culp is a pleasant 22 year old male with intermittent painless gross hematuria - most recent episode confirmed with >100 RBC on urinalysis from 9/1/17 with no evidence for infection (although no culture performed). UA today reveals no blood, but protein and urobilinogen. Review of patient's records indicates chronically elevated serum bilirubin for the past few years with normal transaminases, alk phos, and hemoglobin. He denies history of GI disorder or jaundice. Question diagnosis of Gilbert's disease versus side effect from Accutane? (records indicate patient has been off Accutane since 6/2017). Hematuria and proteinuria also listed as possible side effects from Accutane, but would seem unlikely to cause gross hematuria 4 months after stopping medication.     The differential diagnosis at this point includes stone disease, infection, BPH, prostatitis, urothelial malignancy, renal disorder, medication side effect, versus another yet unknown diagnosis. Will plan for the following:  -Send urine for formal culture, cytology  -Schedule CT urogram for upper tract imaging    Pending results of the above, consider cystoscopy for intravesical examination.     Also encouraged patient to follow up with PCP regarding elevated serum bilirubin.  If urine continues to show protein, then consider referral to nephrology (glomerulonephritis also listed as possible side effect of Accutane, which could also be an explanation for the hematuria if remainder of workup is negative).    Thank you for allowing me to participate in Mr. uClp's care.      About 25 minutes were spent with the patient today, > 50% in counseling and coordination of care.    Chitra Guzmán PA-C  Department of Urology

## 2017-09-15 NOTE — PATIENT INSTRUCTIONS
UROLOGY CLINIC VISIT PATIENT INSTRUCTIONS    1) Schedule CT scan at the [facility]. I will contact you with results.     Please call the following number to set up your imaging appointment : [phone]    Go to the lab on the first floor on your way out of clinic today to recheck a blood test. I will release results to [employer].    If you have any issues, questions or concerns in the meantime, do not hesitate to contact us at [phone] or via [employer].     It was a pleasure meeting with you today.  Thank you for allowing me and my team the privilege of caring for you today.  YOU are the reason we are here, and I truly hope we provided you with the excellent service you deserve.  Please let us know if there is anything else we can do for you so that we can be sure you are leaving completely satisfied with your care experience.

## 2017-09-15 NOTE — MR AVS SNAPSHOT
After Visit Summary   9/15/2017    Neil Culp    MRN: [id number]           Patient Information     Date Of Birth          1995        Visit Information        Provider Department      9/15/2017 9:00 AM Chitra Guzmán PA-C [facility]        Today's Diagnoses     Gross hematuria    -  1      Care Instructions    UROLOGY CLINIC VISIT PATIENT INSTRUCTIONS    1) Schedule CT scan at the [facility]. I will contact you with results.     Please call the following number to set up your imaging appointment : [phone]    Go to the lab on the first floor on your way out of clinic today to recheck a blood test. I will release results to Loopcam.    If you have any issues, questions or concerns in the meantime, do not hesitate to contact us at [phone] or via Loopcam.     It was a pleasure meeting with you today.  Thank you for allowing me and my team the privilege of caring for you today.  YOU are the reason we are here, and I truly hope we provided you with the excellent service you deserve.  Please let us know if there is anything else we can do for you so that we can be sure you are leaving completely satisfied with your care experience.                Follow-ups after your visit        Future tests that were ordered for you today     Open Future Orders        Priority Expected Expires Ordered    CT Abdomen Pelvis Hematuria w/wo IV Contrast Routine  9/15/2018 9/15/2017            Who to contact     If you have questions or need follow up information about today's clinic visit or your schedule please contact [facility] directly at [phone].  Normal or non-critical lab and imaging results will be communicated to you by MyChart, letter or phone within 4 business days after the clinic has received the results. If you do not hear from us within 7 days, please contact the clinic through Videdressingt or phone. If you have a critical  or abnormal lab result, we will notify you by phone as soon as possible.  Submit refill requests through bitHound or call your pharmacy and they will forward the refill request to us. Please allow 3 business days for your refill to be completed.          Additional Information About Your Visit        NERIhart Information     bitHound gives you secure access to your electronic health record. If you see a primary care provider, you can also send messages to your care team and make appointments. If you have questions, please call your primary care clinic.  If you do not have a primary care provider, please call [phone] and they will assist you.      bitHound is an electronic gateway that provides easy, online access to your medical records. With bitHound, you can request a clinic appointment, read your test results, renew a prescription or communicate with your care team.     To access your existing account, please contact your [facility] or call [phone] for assistance.        Care EveryWhere ID     This is your Care EveryWhere ID. This could be used by other organizations to access your [place] medical records  [id number]        Your Vitals Were     Pulse Pulse Oximetry                60 100%           Blood Pressure from Last 3 Encounters:   09/15/17 101/60   09/01/17 111/72   04/08/17 112/69    Weight from Last 3 Encounters:   09/01/17 68.5 kg (151 lb)   09/17/15 66.6 kg (146 lb 14.4 oz) (37 %)*   08/31/15 65.8 kg (145 lb) (34 %)*     * Growth percentiles are based on CDC 2-20 Years data.              We Performed the Following     Comprehensive metabolic panel     Cytology non gyn [[id number]]     UA reflex to Microscopic and Culture     Urine Culture Aerobic Bacterial [[id number]]     Urine Microscopic        Primary Care Provider Office Phone # Fax #    Ambrose Byers [phone]       [address] [place] [address] [address] [place] [address]        Equal Access to Services      DEZ YOUNG : Hadii aad ku chris Woodall, wacarleenda luqadaha, qaybta kagladys olgamarcksuad, waxlj leonora baptistejaraderic will . So [facility] [phone].    ATENCIÓN: Si habla español, tiene a silva disposición servicios gratuitos de asistencia lingüística. Llame al [phone].    We comply with applicable federal civil rights laws and [place] laws. We do not discriminate on the basis of race, color, national origin, age, disability sex, sexual orientation or gender identity.            Thank you!     Thank you for choosing [facility]  for your care. Our goal is always to provide you with excellent care. Hearing back from our patients is one way we can continue to improve our services. Please take a few minutes to complete the written survey that you may receive in the mail after your visit with us. Thank you!             Your Updated Medication List - Protect others around you: Learn how to safely use, store and throw away your medicines at www.disposemymeds.org.          This list is accurate as of: 9/15/17  9:40 AM.  Always use your most recent med list.                   Brand Name Dispense Instructions for use Diagnosis    clindamycin-benzoyl peroxide gel    BENZACLIN    50 g    Apply topically daily    Acne vulgaris       valACYclovir 500 MG tablet    VALTREX    90 tablet    Take 1 tablet (500 mg) by mouth daily    Herpes labialis

## 2017-09-16 LAB
BACTERIA SPEC CULT: NORMAL
SPECIMEN SOURCE: NORMAL

## 2017-09-18 LAB — COPATH REPORT: NORMAL

## 2017-09-19 ENCOUNTER — TELEPHONE (OUTPATIENT)
Dept: UROLOGY | Facility: CLINIC | Age: 22
End: 2017-09-19

## 2017-09-19 DIAGNOSIS — R31.9 HEMATURIA: Primary | ICD-10-CM

## 2017-09-19 NOTE — TELEPHONE ENCOUNTER
Left generic message with request for patient to return call back to clinic. When patient returns call, will discuss 9/18/17 CT results, result note and recommendations from Chitra Guzmán PA-C.    Emma Matos RN, BSN

## 2017-09-20 RX ORDER — TAMSULOSIN HYDROCHLORIDE 0.4 MG/1
0.4 CAPSULE ORAL DAILY
Qty: 30 CAPSULE | Refills: 1 | Status: SHIPPED | OUTPATIENT
Start: 2017-09-20 | End: 2018-09-04

## 2017-09-20 NOTE — TELEPHONE ENCOUNTER
Flomax 0.4mg daily with #30 and 1 refill ordered per written order from Chitra Guzmán PA-C. Prescription sent to [facility] in [place] per patient request.    Emma Matos RN, BSN

## 2017-09-20 NOTE — TELEPHONE ENCOUNTER
Spoke with pt and he was informed of the following per Chitra Dunn:    The results of your CT scan are attached. There is a stone at the junction of the left ureter (tube connecting kidney to bladder) and the bladder. This is the most likely explanation for the blood in your urine. We can start you on a mediation called Flomax (tamsulosin) which you will take once daily. This may help the stone to pass on it's own. I have asked my nurses to contact you to confirm your preferred pharmacy so that we can send the prescription for you.   Please drink plenty of water (6-8 glasses daily) to keep the urine dilute.   You may  a strainer from clinic to strain your urine. If you pass the stone and capture it in the strainer, this can be brought to clinic or the lab to be sent for a stone analysis.   Lastly, I would like you to follow up with me in 1 month with an x-ray beforehand to monitor the position of the stone. However, if you pass the stone before then, you do not need to have the x-ray done.   Let me know if you have any questions or concerns.   Thank you,   Chitra Guzmán PA-C     Pt states he has indeed read the [employer] message. Pt would like medication sent to [facility] please send Flomax.   Pt will go to the [facility] to  strainer on Friday 09/22.   Pt would like to set up appts for x-ray and 1 month f/u with [facility] downtown.   Pt was in class and states he will call back to set up both appointments.

## 2017-09-23 ENCOUNTER — MYC MEDICAL ADVICE (OUTPATIENT)
Dept: UROLOGY | Facility: CLINIC | Age: 22
End: 2017-09-23

## 2017-09-23 DIAGNOSIS — R31.9 HEMATURIA: Primary | ICD-10-CM

## 2017-09-28 NOTE — TELEPHONE ENCOUNTER
Pt provided information to make appt at the [facility] location for imaging and f/u with Chitra ramirez.

## 2017-10-09 ENCOUNTER — APPOINTMENT (OUTPATIENT)
Dept: LAB | Facility: CLINIC | Age: 22
End: 2017-10-09
Attending: PHYSICIAN ASSISTANT
Payer: COMMERCIAL

## 2017-10-25 DIAGNOSIS — R31.0 GROSS HEMATURIA: Primary | ICD-10-CM

## 2018-03-03 ENCOUNTER — OFFICE VISIT (OUTPATIENT)
Dept: CARDIOLOGY | Facility: CLINIC | Age: 23
End: 2018-03-03
Attending: INTERNAL MEDICINE
Payer: COMMERCIAL

## 2018-03-03 VITALS
SYSTOLIC BLOOD PRESSURE: 112 MMHG | DIASTOLIC BLOOD PRESSURE: 71 MMHG | BODY MASS INDEX: 19.9 KG/M2 | WEIGHT: 146.9 LBS | OXYGEN SATURATION: 98 % | HEART RATE: 80 BPM | HEIGHT: 72 IN

## 2018-03-03 DIAGNOSIS — R07.9 CHEST PAIN, UNSPECIFIED TYPE: Primary | ICD-10-CM

## 2018-03-03 DIAGNOSIS — I30.0 ACUTE IDIOPATHIC PERICARDITIS: ICD-10-CM

## 2018-03-03 PROCEDURE — 93005 ELECTROCARDIOGRAM TRACING: CPT | Mod: ZF

## 2018-03-03 PROCEDURE — G0463 HOSPITAL OUTPT CLINIC VISIT: HCPCS | Mod: ZF

## 2018-03-03 PROCEDURE — 93010 ELECTROCARDIOGRAM REPORT: CPT | Mod: ZP | Performed by: INTERNAL MEDICINE

## 2018-03-03 PROCEDURE — 99204 OFFICE O/P NEW MOD 45 MIN: CPT | Mod: ZP | Performed by: INTERNAL MEDICINE

## 2018-03-03 ASSESSMENT — PAIN SCALES - GENERAL: PAINLEVEL: NO PAIN (0)

## 2018-03-03 NOTE — NURSING NOTE
Cardiac Testing: Patient given instructions regarding  echocardiogram . Discussed purpose, preparation, procedure and when to expect results reported back to the patient. Patient demonstrated understanding of this information and agreed to call with further questions or concerns.  Med Reconcile: Reviewed and verified all current medications with the patient. The updated medication list was printed and given to the patient.  Patient stated he understood all health information given and agreed to call with further questions or concerns.

## 2018-03-03 NOTE — MR AVS SNAPSHOT
After Visit Summary   3/3/2018    Neil Culp    MRN: [id number]           Patient Information     Date Of Birth          1995        Visit Information        Provider Department      3/3/2018 10:30 AM Markie Garza MD [facility]        Today's Diagnoses     Chest pain, unspecified type    -  1    Acute idiopathic pericarditis          Care Instructions      It was a pleasure to see you in the cardiology clinic today.    If you have any questions, you can reach my nurse, Lexie Terrell, at [phone].  Press Option #1 for the [facility], and then press Option #3 for nursing.    Note the new medications: None  Stop the following medications: None    The results from today include: None    Tests ordered today: ECHO, SCHEDULED [place] Wednesday [place] [facility]30 [place]    I would like you to follow up with primary care provider in 3 weeks.    Sincerely,      Markie Garza MD     [facility]        Chest Echocardiography (Transthoracic)     During an echo, images of your heart appear on a monitor.   An echocardiogram (echo) is an imaging test.  A transthoracic echocardiogram is sometimes called by its abbreviation TTE. It may also be called surface echocardiogram because the images are non-invasive taken from the surface of the chest wall. It helps your healthcare provider evaluate your heart. A more invasive type of echocardiogram involves the ultrasound probe being passed into the esophagus to get images (transesophageal).     This test:    Is safe and generally painless. Some people have discomfort from the echo probe being pressed against the bony areas of the chest. This is relieves once the probe is moved.    Can be done in a hospital, test center, or doctor s office    Bounces harmless sound waves (ultrasound) off the heart using a transducer or probe (device that looks like a  microphone)    Allows your healthcare provider evaluate the size and shape of your heart, and the size, thickness and movement of your heart's walls, and the heart's pumping strength.    Shows if the heart valves are working correctly, if blood is leaking backwards through your heart valves (regurgitation), or if the heart valves are to onarrow (stenosis)    Shows if there is a tumor or infectious growth around your heart valves    Will help your healthcare provider find out if there are problems with the outer lining of your heart (pericardium)    Shows problems with the large blood vessels that enter and leave the heart    Demonstrates blood clots in the heart chambers    Shows abnormal holes between heart chambers  Before your echo    Discuss any questions or concerns you have with your healthcare provider.    Mention any over-the-counter or prescription medicines, herbs, or supplements you re taking.    Allow extra time for checking in. Bring your insurance cards, identification, and any co-payments that are required for the test.    Wear a 2-piece outfit for the test. You may be asked to remove clothing and jewelry from the waist up. If so, you ll be given a short hospital gown.    An intravenous catheter may be inserted into a vein in your arm or hand. Contrast or bubbles will be injected during the study.  During your echo    Small pads (electrodes) are placed on your chest to monitor your heartbeat.    A transducer coated with cool gel is moved firmly over your chest. This device creates the sound waves that make images of your heart. If you are overweight, the technician may have to apply more pressure to the chest wall to improve the quality of the images. This pressure can be uncomfortable over bony areas. Tell your technician if you are uncomfortable.    At times, you may be asked to exhale and hold your breath for a few seconds. Air in your lungs can affect the images.    The transducer may also be used  to do a Doppler study. This test measures the direction and speed of blood flowing through the heart. During the test, you may hear a  whooshing  sound. This is the sound of blood flowing through the heart.    The technician may use IV contrast to improve the image quality or agitated saline may be used to follow blood flow through the chambers of the heart.    The images of your heart are stored electronically. This is so your healthcare provider can review them later.  After your echo    Return to normal activity unless your healthcare provider tells you otherwise.    Be sure to keep follow-up appointments.  Your test results  Your healthcare provider will discuss your test results with you during a future office visit. The test results help the healthcare provider plan your treatment and any other tests that are needed.  Date Last Reviewed: 12/1/2016 2000-2017 [employer]. [address] [place] [address]. All rights reserved. This information is not intended as a substitute for professional medical care. Always follow your healthcare professional's instructions.                Follow-ups after your visit        Your next 10 appointments already scheduled     Mar 07, 2018  3:30 PM CST   Ech Complete with [address] Echo ([facility])    [address] [place] [id number] [phone]           1. Please bring or wear a comfortable two-piece outfit. 2. You may eat, drink and take your normal medicines. 3. For any questions that cannot be answered, please contact the ordering physician              Future tests that were ordered for you today     Open Future Orders        Priority Expected Expires Ordered    Echocardiogram Complete Routine  3/3/2019 3/3/2018            Who to contact     If you have questions or need follow up information about today's clinic visit or your schedule please contact [facility] directly at  [phone].  Normal or non-critical lab and imaging results will be communicated to you by MyChart, letter or phone within 4 business days after the clinic has received the results. If you do not hear from us within 7 days, please contact the clinic through OpenBSD Foundationhart or phone. If you have a critical or abnormal lab result, we will notify you by phone as soon as possible.  Submit refill requests through [employer] or call your pharmacy and they will forward the refill request to us. Please allow 3 business days for your refill to be completed.          Additional Information About Your Visit        [employer] Foundation[employer] Information     [employer] gives you secure access to your electronic health record. If you see a primary care provider, you can also send messages to your care team and make appointments. If you have questions, please call your primary care clinic.  If you do not have a primary care provider, please call [phone] and they will assist you.        Care EveryWhere ID     This is your Care EveryWhere ID. This could be used by other organizations to access your [place] medical records  [id number]        Your Vitals Were     Pulse Height Pulse Oximetry BMI (Body Mass Index)          80 1.829 m (6') 98% 19.92 kg/m2         Blood Pressure from Last 3 Encounters:   03/03/18 112/71   09/15/17 101/60   09/01/17 111/72    Weight from Last 3 Encounters:   03/03/18 66.6 kg (146 lb 14.4 oz)   09/01/17 68.5 kg (151 lb)   09/17/15 66.6 kg (146 lb 14.4 oz) (37 %)*     * Growth percentiles are based on CDC 2-20 Years data.              We Performed the Following     EKG 12-lead, tracing only (Same Day)        Primary Care Provider Office Phone # Fax #    Ambrose Byers [phone]       [address] [place] [address] [address] [place] [address]        Equal Access to Services     DEZ YOUNG : Yvette Woodall, waaxda luqadaha, qaybta kaalmada armen, henrry crockett. So [facility]  [phone].    ATENCIÓN: Si damon nathan, tiene a silva disposición servicios gratuitos de asistencia lingüística. Cindy palacios [phone].    We comply with applicable federal civil rights laws and [place] laws. We do not discriminate on the basis of race, color, national origin, age, disability, sex, sexual orientation, or gender identity.            Thank you!     Thank you for choosing [facility]  for your care. Our goal is always to provide you with excellent care. Hearing back from our patients is one way we can continue to improve our services. Please take a few minutes to complete the written survey that you may receive in the mail after your visit with us. Thank you!             Your Updated Medication List - Protect others around you: Learn how to safely use, store and throw away your medicines at www.disposemymeds.org.          This list is accurate as of 3/3/18 11:05 AM.  Always use your most recent med list.                   Brand Name Dispense Instructions for use Diagnosis    clindamycin-benzoyl peroxide gel    BENZACLIN    50 g    Apply topically daily    Acne vulgaris       tamsulosin 0.4 MG capsule    FLOMAX    30 capsule    Take 1 capsule (0.4 mg) by mouth daily    Hematuria       valACYclovir 500 MG tablet    VALTREX    90 tablet    Take 1 tablet (500 mg) by mouth daily    Herpes labialis

## 2018-03-03 NOTE — NURSING NOTE
Chief Complaint   Patient presents with     New Patient     palpitations and heartburn for past few days per PT     Vitals were taken and medications were reconciled.  Ollie Bingham, IRENE  10:37 AM

## 2018-03-03 NOTE — PROGRESS NOTES
"CARDIOLOGY CONSULTATION    Referring Provider:  Referred Self  Primary Care Provider:   Ambrose Byers  Indication for Consultation:  Recurrent chest pressure / heartburn    HPI: Neil Culp is a 23 year old male being seen today for evaluation of recurrent chest pressure / heartburn.   The patient's risk factor profile is: (-) HTN, (-) DM, (-) hypercholesterolemia, (-) tobacco use, (-) fam Hx premature CAD.  The patient has no history of cardiovascular disease (CAD, CHF, arrhythmia, valvular heart disease).  The patient has no Hx of PAD or cerebrovascular disease.  The patient has not undergone prior cardiovascular evaluation and has never had an ECHO, stress study, cardiac catheterization, or EP study.   The patient was in his good state of health until 3-4 days ago when he began to experience chest pressure.  He feels like something is \"pushing down\" on the left side of the chest .  The chest discomfort is mild-moderate in severity.  It lasts for several hours at a time.  It has been occurring in the evening and has been present both at rest and after walking.  It is located over the precordium and does not radiate.  He describes a \"tightness\".  It is less apparent in the mornings.  He denies dyspnea, PND, orthopnea, pedal edema, palpitations, lightheadedness, and syncope.    PAST MEDICAL HISTORY:  No past medical history on file.    CURRENT MEDICATIONS:  Current Outpatient Prescriptions   Medication Sig Dispense Refill     valACYclovir (VALTREX) 500 MG tablet Take 1 tablet (500 mg) by mouth daily 90 tablet 3     clindamycin-benzoyl peroxide (BENZACLIN) gel Apply topically daily 50 g 11     tamsulosin (FLOMAX) 0.4 MG capsule Take 1 capsule (0.4 mg) by mouth daily 30 capsule 1       PAST SURGICAL HISTORY:  Past Surgical History:   Procedure Laterality Date     wisdom teeth          ALLERGIES  Review of patient's allergies indicates no known allergies.    FAMILY HX:  Family History   Problem " Relation Age of Onset     Prostate Cancer Maternal Grandfather      Breast Cancer Mother      DIABETES Maternal Grandmother      Hypertension Maternal Grandmother      Psychotic Disorder Father      Bipolar     Retinal detachment Brother      possible     Glaucoma No family hx of      Macular Degeneration No family hx of      CANCER No family hx of      No family history of skin cancer     Melanoma No family hx of      Skin Cancer No family hx of        SOCIAL HX:  Social History     Social History     Marital status: Single     Spouse name: N/A     Number of children: N/A     Years of education: N/A     Social History Main Topics     Smoking status: Never Smoker     Smokeless tobacco: Never Used     Alcohol use No     Drug use: No     Sexual activity: No     Other Topics Concern     Not on file     Social History Narrative       ROS:  Constitutional: No fever, chills, or sweats. No weight gain/loss.   HEENT: No visual disturbance, ear ache, epistaxis, sore throat.   Allergies/Immunologic: Negative.   Respiratory: No cough, hemoptysis.   Cardiovascular: As per HPI.   GI: No nausea, vomiting, hematemesis, melena, or hematochezia.   : No urinary frequency, dysuria, or hematuria.   Integument: No rash.   Psychiatric: No anxiety / depression.   Neuro: No speech disturbance, focal sensory or motor deficit.   Endocrinology: No polyuria / polyphagia.   Musculoskeletal: No myalgia.    VITAL SIGNS:  /71 (BP Location: Left arm, Patient Position: Chair, Cuff Size: Adult Regular)  Pulse 80  Ht 1.829 m (6')  Wt 66.6 kg (146 lb 14.4 oz)  SpO2 98%  BMI 19.92 kg/m2  Body mass index is 19.92 kg/(m^2).  Wt Readings from Last 2 Encounters:   03/03/18 66.6 kg (146 lb 14.4 oz)   09/01/17 68.5 kg (151 lb)       PHYSICAL EXAM  Neil Culp is a 23 year old male.in no acute distress.  HEENT: Eyes Nonicteric.  Neck: JVP normal.  Carotids +3/3 bilaterally without bruits.  Lungs: CTA.  Cor: RRR. Normal S1 and  S2.  No murmur, rub, or gallop.  PMI in Lf 5th ICS.  Abd: Soft, nontender, nondistended.  NABS.  No pulsatile mass.  Extremities: No C/C/E.  Pulses +3/3 symmetric in upper and lower extremities.  Neuro: Grossly intact.  Psych: A&O x 3.  Skin: No rash.    LABS  Recent Labs   Lab Test  04/08/17   2225  02/05/16   1329   WBC  7.3  5.2   HGB  15.2  15.4   HCT  44.1  43.7   PLT  226  244     Recent Labs   Lab Test  09/15/17   0950  09/01/17   0908   NA  139  136   POTASSIUM  3.8  3.9   CHLORIDE  107  102   CO2  30  28   GLC  82  83   BUN  13  14   CR  0.98  1.22   ORQUIDEA  8.3*  8.5     Recent Labs   Lab Test  02/05/16   1329  12/02/15   1340  10/28/15   1455  09/30/15   1639   CHOL  130  138  138  138   HDL  44*  36*  37*  33*   LDL  74  88  71  67   TRIG  62  72  149  191*   CHOLHDLRATIO   --    --   3.7  4.2   NHDL  86  102   --    --         EKG:  3/3/18  NSR with sinus arrhythmia.  Rightward axis.  Incomplete RBBB.    ECHO: None    STRESS TEST:  None    CARDIAC CATH: None    ASSESSMENT AND PLAN:   1. Atypical Chest Pain.  Mr. Haskins has atypical chest pain, normal exam, and normal ECG.  Check ECHO to rule out pericardial effusion, possible pericarditis and to ensure no valvular heart disease or LV dysfunction.   Trial of NSAIDS.    2. 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  I noted the patient's height and what may have been hyperextension in wrists.  I would like to assess the aortic root by ECHO to ensure there is no aortic dilation.      FOLLOW UP:  If all studies negative, follow up with PCP.      ECHO (3/7/18):  Normal biventricular size, thickness, global, and regional systolic function,  LVEF=55-60% (traced 55%.)  Estimated pulmonary artery systolic pressure is normal.  No significant valvular abnormalities are noted.  Normal inferior vena cava with normal respiratory variation (estimated RA pressure 3 mmHg.)  No pericardial effusion.  Previous study not available for comparison.    ADDENDUM (3/9/18): I would not recommend  any further workup at this time.  If chest discomfort does not improve or becomes more typical, a stress test could be performed.  However, in the absence of something like anomalous coronary artery, I would not expect an abnormal test.    Markie Garza MD    Divisions of Cardiology  [facility]  Patient Care Team:  Ambrose Byers MD as PCP - General (Internal Medicine)  Collette, Karena Card PA-C as Physician Assistant (Physician Assistant)  Jose R Whipple MD as MD (Internal Medicine)  Markie Garza MD as MD (Internal Medicine - Interventional Cardiology)  SELF, REFERRED

## 2018-03-03 NOTE — PATIENT INSTRUCTIONS
It was a pleasure to see you in the cardiology clinic today.    If you have any questions, you can reach my nurse, Lexie Terrell, at [phone].  Press Option #1 for the [facility], and then press Option #3 for nursing.    Note the new medications: None  Stop the following medications: None    The results from today include: None    Tests ordered today: ECHO, SCHEDULED FOR Wednesday THE 7TH HERE AT THE [facility], TIME 9:00 AM     I would like you to follow up with primary care provider in 3 weeks.    Sincerely,      Markie Garza MD     [facility]        Chest Echocardiography (Transthoracic)     During an echo, images of your heart appear on a monitor.   An echocardiogram (echo) is an imaging test.  A transthoracic echocardiogram is sometimes called by its abbreviation TTE. It may also be called surface echocardiogram because the images are non-invasive taken from the surface of the chest wall. It helps your healthcare provider evaluate your heart. A more invasive type of echocardiogram involves the ultrasound probe being passed into the esophagus to get images (transesophageal).     This test:    Is safe and generally painless. Some people have discomfort from the echo probe being pressed against the bony areas of the chest. This is relieves once the probe is moved.    Can be done in a hospital, test center, or doctor s office    Bounces harmless sound waves (ultrasound) off the heart using a transducer or probe (device that looks like a microphone)    Allows your healthcare provider evaluate the size and shape of your heart, and the size, thickness and movement of your heart's walls, and the heart's pumping strength.    Shows if the heart valves are working correctly, if blood is leaking backwards through your heart valves (regurgitation), or if the heart valves are to onarrow (stenosis)    Shows if there is a tumor or infectious growth around your  heart valves    Will help your healthcare provider find out if there are problems with the outer lining of your heart (pericardium)    Shows problems with the large blood vessels that enter and leave the heart    Demonstrates blood clots in the heart chambers    Shows abnormal holes between heart chambers  Before your echo    Discuss any questions or concerns you have with your healthcare provider.    Mention any over-the-counter or prescription medicines, herbs, or supplements you re taking.    Allow extra time for checking in. Bring your insurance cards, identification, and any co-payments that are required for the test.    Wear a 2-piece outfit for the test. You may be asked to remove clothing and jewelry from the waist up. If so, you ll be given a short hospital gown.    An intravenous catheter may be inserted into a vein in your arm or hand. Contrast or bubbles will be injected during the study.  During your echo    Small pads (electrodes) are placed on your chest to monitor your heartbeat.    A transducer coated with cool gel is moved firmly over your chest. This device creates the sound waves that make images of your heart. If you are overweight, the technician may have to apply more pressure to the chest wall to improve the quality of the images. This pressure can be uncomfortable over bony areas. Tell your technician if you are uncomfortable.    At times, you may be asked to exhale and hold your breath for a few seconds. Air in your lungs can affect the images.    The transducer may also be used to do a Doppler study. This test measures the direction and speed of blood flowing through the heart. During the test, you may hear a  whooshing  sound. This is the sound of blood flowing through the heart.    The technician may use IV contrast to improve the image quality or agitated saline may be used to follow blood flow through the chambers of the heart.    The images of your heart are stored electronically.  This is so your healthcare provider can review them later.  After your echo    Return to normal activity unless your healthcare provider tells you otherwise.    Be sure to keep follow-up appointments.  Your test results  Your healthcare provider will discuss your test results with you during a future office visit. The test results help the healthcare provider plan your treatment and any other tests that are needed.  Date Last Reviewed: 12/1/2016 2000-2017 [employer]. [address] [place] [address], [place] [address]. All rights reserved. This information is not intended as a substitute for professional medical care. Always follow your healthcare professional's instructions.

## 2018-03-03 NOTE — LETTER
"3/3/2018      RE: Neil Culp  [address] [facility] [address]       Dear Colleague,    Thank you for the opportunity to participate in the care of your patient, Neil Culp, at the [facility] HEART [facility]. Please see a copy of my visit note below.    CARDIOLOGY CONSULTATION    Referring Provider:  Referred Self  Primary Care Provider:   Ambrose Byers  Indication for Consultation:  Recurrent chest pressure / heartburn    HPI: Neil Culp is a 23 year old male being seen today for evaluation of recurrent chest pressure / heartburn.   The patient's risk factor profile is: (-) HTN, (-) DM, (-) hypercholesterolemia, (-) tobacco use, (-) fam Hx premature CAD.  The patient has no history of cardiovascular disease (CAD, CHF, arrhythmia, valvular heart disease).  The patient has no Hx of PAD or cerebrovascular disease.  The patient has not undergone prior cardiovascular evaluation and has never had an ECHO, stress study, cardiac catheterization, or EP study.   The patient was in his good state of health until 3-4 days ago when he began to experience chest pressure.  He feels like something is \"pushing down\" on the left side of the chest .  The chest discomfort is mild-moderate in severity.  It lasts for several hours at a time.  It has been occurring in the evening and has been present both at rest and after walking.  It is located over the precordium and does not radiate.  He describes a \"tightness\".  It is less apparent in the mornings.  He denies dyspnea, PND, orthopnea, pedal edema, palpitations, lightheadedness, and syncope.    PAST MEDICAL HISTORY:  No past medical history on file.    CURRENT MEDICATIONS:  Current Outpatient Prescriptions   Medication Sig Dispense Refill     valACYclovir (VALTREX) 500 MG tablet Take 1 tablet (500 mg) by mouth daily 90 tablet 3     clindamycin-benzoyl peroxide (BENZACLIN) " gel Apply topically daily 50 g 11     tamsulosin (FLOMAX) 0.4 MG capsule Take 1 capsule (0.4 mg) by mouth daily 30 capsule 1       PAST SURGICAL HISTORY:  Past Surgical History:   Procedure Laterality Date     wisdom teeth          ALLERGIES  Review of patient's allergies indicates no known allergies.    FAMILY HX:  Family History   Problem Relation Age of Onset     Prostate Cancer Maternal Grandfather      Breast Cancer Mother      DIABETES Maternal Grandmother      Hypertension Maternal Grandmother      Psychotic Disorder Father      Bipolar     Retinal detachment Brother      possible     Glaucoma No family hx of      Macular Degeneration No family hx of      CANCER No family hx of      No family history of skin cancer     Melanoma No family hx of      Skin Cancer No family hx of        SOCIAL HX:  Social History     Social History     Marital status: Single     Spouse name: N/A     Number of children: N/A     Years of education: N/A     Social History Main Topics     Smoking status: Never Smoker     Smokeless tobacco: Never Used     Alcohol use No     Drug use: No     Sexual activity: No     Other Topics Concern     Not on file     Social History Narrative       ROS:  Constitutional: No fever, chills, or sweats. No weight gain/loss.   HEENT: No visual disturbance, ear ache, epistaxis, sore throat.   Allergies/Immunologic: Negative.   Respiratory: No cough, hemoptysis.   Cardiovascular: As per HPI.   GI: No nausea, vomiting, hematemesis, melena, or hematochezia.   : No urinary frequency, dysuria, or hematuria.   Integument: No rash.   Psychiatric: No anxiety / depression.   Neuro: No speech disturbance, focal sensory or motor deficit.   Endocrinology: No polyuria / polyphagia.   Musculoskeletal: No myalgia.    VITAL SIGNS:  /71 (BP Location: Left arm, Patient Position: Chair, Cuff Size: Adult Regular)  Pulse 80  Ht 1.829 m (6')  Wt 66.6 kg (146 lb 14.4 oz)  SpO2 98%  BMI 19.92 kg/m2  Body mass index  is 19.92 kg/(m^2).  Wt Readings from Last 2 Encounters:   03/03/18 66.6 kg (146 lb 14.4 oz)   09/01/17 68.5 kg (151 lb)       PHYSICAL EXAM  Neil Culp is a 23 year old male.in no acute distress.  HEENT: Eyes Nonicteric.  Neck: JVP normal.  Carotids +3/3 bilaterally without bruits.  Lungs: CTA.  Cor: RRR. Normal S1 and S2.  No murmur, rub, or gallop.  PMI in Lf 5th ICS.  Abd: Soft, nontender, nondistended.  NABS.  No pulsatile mass.  Extremities: No C/C/E.  Pulses +3/3 symmetric in upper and lower extremities.  Neuro: Grossly intact.  Psych: A&O x 3.  Skin: No rash.    LABS  Recent Labs   Lab Test  04/08/17   2225  02/05/16   1329   WBC  7.3  5.2   HGB  15.2  15.4   HCT  44.1  43.7   PLT  226  244     Recent Labs   Lab Test  09/15/17   0950  09/01/17   0908   NA  139  136   POTASSIUM  3.8  3.9   CHLORIDE  107  102   CO2  30  28   GLC  82  83   BUN  13  14   CR  0.98  1.22   ORQUIDEA  8.3*  8.5     Recent Labs   Lab Test  02/05/16   1329  12/02/15   1340  10/28/15   1455  09/30/15   1639   CHOL  130  138  138  138   HDL  44*  36*  37*  33*   LDL  74  88  71  67   TRIG  62  72  149  191*   CHOLHDLRATIO   --    --   3.7  4.2   NHDL  86  102   --    --         EKG:  3/3/18  NSR with sinus arrhythmia.  Rightward axis.  Incomplete RBBB.    ECHO: None    STRESS TEST:  None    CARDIAC CATH: None    ASSESSMENT AND PLAN:   1. Atypical Chest Pain.  Mr. Haskins has atypical chest pain, normal exam, and normal ECG.  Check ECHO to rule out pericardial effusion, possible pericarditis and to ensure no valvular heart disease or LV dysfunction.   Trial of NSAIDS.    2. 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  I noted the patient's height and what may have been hyperextension in wrists.  I would like to assess the aortic root by ECHO to ensure there is no aortic dilation.      FOLLOW UP:  If all studies negative, follow up with PCP.      Markie Garza MD    Divisions of Cardiology  [facility]   [place], [place]    CC  Patient Care Team:  Ambrose Byers MD as PCP - General (Internal Medicine)  Karena Murdock PA-C as Physician Assistant (Physician Assistant)  Jose R Whipple MD as MD (Internal Medicine)  Markie Garza MD as MD (Internal Medicine - Interventional Cardiology)  SELF, REFERRED

## 2018-03-05 LAB — INTERPRETATION ECG - MUSE: NORMAL

## 2018-03-07 ENCOUNTER — RADIANT APPOINTMENT (OUTPATIENT)
Dept: CARDIOLOGY | Facility: CLINIC | Age: 23
End: 2018-03-07
Attending: INTERNAL MEDICINE
Payer: COMMERCIAL

## 2018-03-07 DIAGNOSIS — I30.0 ACUTE IDIOPATHIC PERICARDITIS: ICD-10-CM

## 2018-03-07 RX ADMIN — Medication 6 ML: at 10:00

## 2018-05-15 DIAGNOSIS — L70.0 ACNE VULGARIS: ICD-10-CM

## 2018-05-16 RX ORDER — CLINDAMYCIN AND BENZOYL PEROXIDE 10; 50 MG/G; MG/G
GEL TOPICAL DAILY
Qty: 50 G | Refills: 11 | OUTPATIENT
Start: 2018-05-16

## 2018-05-16 NOTE — TELEPHONE ENCOUNTER
clindamycin-benzoyl peroxide (BENZACLIN) gel  Last Written Prescription Date:  6/20/16  Last Fill Quantity: 50g,   # refills: 11  Last Office Visit : 8/1/16  Future Office visit:  None    Scheduling has been notified to contact the pt for appointment.    Med refused.

## 2018-05-18 RX ORDER — CLINDAMYCIN AND BENZOYL PEROXIDE 10; 50 MG/G; MG/G
GEL TOPICAL DAILY
Qty: 50 G | Refills: 0 | Status: SHIPPED | OUTPATIENT
Start: 2018-05-18 | End: 2018-06-27

## 2018-05-18 NOTE — TELEPHONE ENCOUNTER
As resident on call, received refill request. Chart and attached communication reviewed. Patient last seen 8/2016. Per Dr. [place], plan at that time was to continue clindamycin-benzoyl peroxide gel and rtc in 6 months. Rx refilled with 0 refills. Patient advised he would need follow up prior to further refills as he has not been seen in over a year.    Penelope Granger MD  PGY-3, Dermatology  [phone]  Resident on Call

## 2018-06-27 ENCOUNTER — OFFICE VISIT (OUTPATIENT)
Dept: DERMATOLOGY | Facility: CLINIC | Age: 23
End: 2018-06-27
Payer: COMMERCIAL

## 2018-06-27 DIAGNOSIS — L70.0 ACNE VULGARIS: ICD-10-CM

## 2018-06-27 RX ORDER — TRETINOIN 0.5 MG/G
CREAM TOPICAL
Qty: 45 G | Refills: 11 | Status: SHIPPED | OUTPATIENT
Start: 2018-06-27

## 2018-06-27 RX ORDER — CLINDAMYCIN AND BENZOYL PEROXIDE 10; 50 MG/G; MG/G
GEL TOPICAL
Qty: 50 G | Refills: 11 | Status: SHIPPED | OUTPATIENT
Start: 2018-06-27 | End: 2018-09-04

## 2018-06-27 ASSESSMENT — PAIN SCALES - GENERAL: PAINLEVEL: NO PAIN (0)

## 2018-06-27 NOTE — PROGRESS NOTES
[facility] Dermatology Note      Dermatology Problem List:  1. Acne vulgaris, nodulocystic with scarring - restarted Benzaclin and Tretinoin 0.05% 6/27/18.  -s/p Bactrim initiated 7/20/2015  -s/p Benzaclin initiated 4/8/2015  -s/p Cephalix 500mg TID initiated 4/8/2015  -s/p Epiduo initiated 6/24/2014  -s/p minocycline initiated 6/7/2013  -s/p Accutane 10/1/2012-5/7/2013 total cumulative dose 17,100mg with improvement however was not clear, restarted 8/31/2015-4/11/2016 total cumulative dose 12,000  -s/p doxycycline initiated 8/9/2012  -s/p Differin initiated 8/9/2012  -s/p benzoyl peroxide wash initiated 8/9/2012  2. History of oral herpes  -s/p acyclovir and valacyclovir    Encounter Date: Jun 27, 2018    CC:  Chief Complaint   Patient presents with     Derm Problem     Neil is here for a acne follow up, states it's been good.          History of Present Illness:  Mr. Neil Culp is a 23 year old male who presents as a follow-up for acneiform scarring. The patient was last seen 8/1/2016 by Dr. [place] at which visit she was told to continue his Benzaclin and tretinoin. The patient reports that he just finished college. In general, his skin has been doing well since his last visit. He has not had to use his topicals, as he has not had a true breakout in some time. The patient currently only uses a salicylic acid wash.  In general, the topicals worked well for him. He reports no acne on his chest or back. Otherwise, he reports no major areas of concern needed to be addressed at this visit.    Past Medical History:   Patient Active Problem List   Diagnosis     Congenital pectus carinatum     Acne     Encounter for long-term current use of medication     Nodular acne     Myopia     Wart     No past medical history on file.  Past Surgical History:   Procedure Laterality Date     wisdom teeth          Social History:  The patient is studying [employer]. He will be  heading to [employer] to pursue a master's degree.    Family History:  Not obtained at visit.     Medications:  Current Outpatient Prescriptions   Medication Sig Dispense Refill     valACYclovir (VALTREX) 500 MG tablet Take 1 tablet (500 mg) by mouth daily (Patient not taking: Reported on 6/27/2018) 90 tablet 3     clindamycin-benzoyl peroxide (BENZACLIN) gel Apply topically daily (Patient not taking: Reported on 6/27/2018) 50 g 0     tamsulosin (FLOMAX) 0.4 MG capsule Take 1 capsule (0.4 mg) by mouth daily (Patient not taking: Reported on 6/27/2018) 30 capsule 1       No Known Allergies    Review of Systems:  -Skin: As above in HPI. No additional skin concerns.  -Const: The patient is generally feeling well today.     Physical exam:  Vitals: There were no vitals taken for this visit.  GEN: This is a well developed, well-nourished male in no acute distress, in a pleasant mood.    SKIN: Acne exam, which includes the face, neck, upper central chest, and upper central back was performed.  -Acneiform depressed rolling, boxcar scars on the bilateral cheeks.   -Inflammatory papules scattered to the face, approx. 7 total  -No other lesions of concern on areas examined.       Impression/Plan:  1. Acne vulgaris, inflammatory w/ acne scarring s/p Accutane X2 with improvement however not clear, s/p doxycycline, minocycyline, Cephalix, Bactrim, s/p benzoyl peroxide, Differin, Epiduo, Benzaclin    Restart Benzaclin gel. Apply to the face in the morning.     Restart Tretinoin 0.05% gel. Apply once daily at night. Retinoid ed reviewed.       Follow-up as needed for new or changing lesions.      Staff Involved:  Scribe/Staff    Scribe Disclosure  I, Agustin Vallejo, am serving as a scribe to document services personally performed by  Karena Murdock PA-C, based on data collection and the provider's statements to me.     Provider Disclosure:   The documentation recorded by the scribe accurately reflects the services I personally performed  and the decisions made by me.    All risks, benefits and alternatives were discussed with patient.  Patient is in agreement and understands the assessment and plan.  All questions were answered.  Sun Screen Education was given.   Return to Clinic  as needed.   Karena Murdock PA-C   [facility]

## 2018-06-27 NOTE — MR AVS SNAPSHOT
After Visit Summary   6/27/2018    Neil Culp    MRN: [id number]           Patient Information     Date Of Birth          1995        Visit Information        Provider Department      6/27/2018 1:00 PM Karena Murdock PA-C [facility] Dermatology        Today's Diagnoses     Acne vulgaris           Follow-ups after your visit        Follow-up notes from your care team     Return if symptoms worsen or fail to improve.      Who to contact     Please call your clinic at [phone] to:    Ask questions about your health    Make or cancel appointments    Discuss your medicines    Learn about your test results    Speak to your doctor            Additional Information About Your Visit        MyChart Information     Tap[employer]wd gives you secure access to your electronic health record. If you see a primary care provider, you can also send messages to your care team and make appointments. If you have questions, please call your primary care clinic.  If you do not have a primary care provider, please call [phone] and they will assist you.      TapFwd is an electronic gateway that provides easy, online access to your medical records. With TapFwd, you can request a clinic appointment, read your test results, renew a prescription or communicate with your care team.     To access your existing account, please contact your [facility] or call [phone] for assistance.        Care EveryWhere ID     This is your Care EveryWhere ID. This could be used by other organizations to access your [facility] medical records  [id number]         Blood Pressure from Last 3 Encounters:   03/03/18 112/71   09/15/17 101/60   09/01/17 111/72    Weight from Last 3 Encounters:   03/03/18 66.6 kg (146 lb 14.4 oz)   09/01/17 68.5 kg (151 lb)   09/17/15 66.6 kg (146 lb 14.4 oz) (37 %)*     * Growth percentiles are based on CDC 2-20 Years data.              Today, you had the  following     No orders found for display         Today's Medication Changes          These changes are accurate as of 6/27/18 10:10 PM.  If you have any questions, ask your nurse or doctor.               Start taking these medicines.        Dose/Directions    tretinoin 0.05 % cream   Commonly known as:  RETIN-A   Used for:  Acne vulgaris   Started by:  Karena Murdock PA-C        Spread a pea size amount into affected area topically at bedtime.  Use sunscreen SPF>20.   Quantity:  45 g   Refills:  11         These medicines have changed or have updated prescriptions.        Dose/Directions    clindamycin-benzoyl peroxide gel   Commonly known as:  BENZACLIN   This may have changed:    - how to take this  - when to take this  - additional instructions   Used for:  Acne vulgaris   Changed by:  Karena Murdock PA-C        Apply topically daily to the face.   Quantity:  50 g   Refills:  11            Where to get your medicines      These medications were sent to [facility] [address] [place], [place] [address] [facility]  [address] [facility] [address]     Phone:  [phone]     clindamycin-benzoyl peroxide gel    tretinoin 0.05 % cream                Primary Care Provider Office Phone # Fax #    Ambrose Byers [phone]       [address] [place] [address] [address] [place] [address]        Equal Access to Services     DEZ YOUNG AH: Hadii cristofer ku hadasho Soomaali, waaxda luqadaha, qaybta kaalmada adeegyada, henrry boswellin hayaan rossy crockett. So [facility] [phone].    ATENCIÓN: Si habla español, tiene a silva disposición servicios gratuitos de asistencia lingüística. Cindy al [phone].    We comply with applicable federal civil rights laws and [place] laws. We do not discriminate on the basis of race, color, national origin, age, disability, sex, sexual orientation, or gender identity.            Thank you!     Thank you for choosing [facility] DERMATOLOGY  for your care. Our goal is always  to provide you with excellent care. Hearing back from our patients is one way we can continue to improve our services. Please take a few minutes to complete the written survey that you may receive in the mail after your visit with us. Thank you!             Your Updated Medication List - Protect others around you: Learn how to safely use, store and throw away your medicines at www.disposemymeds.org.          This list is accurate as of 6/27/18 10:10 PM.  Always use your most recent med list.                   Brand Name Dispense Instructions for use Diagnosis    clindamycin-benzoyl peroxide gel    BENZACLIN    50 g    Apply topically daily to the face.    Acne vulgaris       tamsulosin 0.4 MG capsule    FLOMAX    30 capsule    Take 1 capsule (0.4 mg) by mouth daily    Hematuria       tretinoin 0.05 % cream    RETIN-A    45 g    Spread a pea size amount into affected area topically at bedtime.  Use sunscreen SPF>20.    Acne vulgaris       valACYclovir 500 MG tablet    VALTREX    90 tablet    Take 1 tablet (500 mg) by mouth daily    Herpes labialis

## 2018-06-27 NOTE — LETTER
6/27/2018       RE: Neil Culp  [address] [facility] [address]     Dear Colleague,    Thank you for referring your patient, Neil Culp, to the [facility] DERMATOLOGY at [facility]. Please see a copy of my visit note below.    [facility] Dermatology Note      Dermatology Problem List:  1. Acne vulgaris, nodulocystic with scarring - restarted Benzaclin and Tretinoin 0.05% 6/27/18.  -s/p Bactrim initiated 7/20/2015  -s/p Benzaclin initiated 4/8/2015  -s/p Cephalix 500mg TID initiated 4/8/2015  -s/p Epiduo initiated 6/24/2014  -s/p minocycline initiated 6/7/2013  -s/p Accutane 10/1/2012-5/7/2013 total cumulative dose 17,100mg with improvement however was not clear, restarted 8/31/2015-4/11/2016 total cumulative dose 12,000  -s/p doxycycline initiated 8/9/2012  -s/p Differin initiated 8/9/2012  -s/p benzoyl peroxide wash initiated 8/9/2012  2. History of oral herpes  -s/p acyclovir and valacyclovir    Encounter Date: Jun 27, 2018    CC:  Chief Complaint   Patient presents with     Derm Problem     Neil is here for a acne follow up, states it's been good.    History of Present Illness:  Mr. Neil Culp is a 23 year old male who presents as a follow-up for acneiform scarring. The patient was last seen 8/1/2016 by Dr. Middleton at which visit she was told to continue his Benzaclin and tretinoin. The patient reports that he just finished college. In general, his skin has been doing well since his last visit. He has not had to use his topicals, as he has not had a true breakout in some time. The patient currently only uses a salicylic acid wash.  In general, the topicals worked well for him. He reports no acne on his chest or back. Otherwise, he reports no major areas of concern needed to be addressed at this visit.    Past Medical History:   Patient Active Problem List   Diagnosis     Congenital  pectus carinatum     Acne     Encounter for long-term current use of medication     Nodular acne     Myopia     Wart     No past medical history on file.  Past Surgical History:   Procedure Laterality Date     wisdom teeth          Social History:  The patient is studying [employer]. He will be heading to [employer] to pursue a master's degree.    Family History:  Not obtained at visit.     Medications:  Current Outpatient Prescriptions   Medication Sig Dispense Refill     valACYclovir (VALTREX) 500 MG tablet Take 1 tablet (500 mg) by mouth daily (Patient not taking: Reported on 6/27/2018) 90 tablet 3     clindamycin-benzoyl peroxide (BENZACLIN) gel Apply topically daily (Patient not taking: Reported on 6/27/2018) 50 g 0     tamsulosin (FLOMAX) 0.4 MG capsule Take 1 capsule (0.4 mg) by mouth daily (Patient not taking: Reported on 6/27/2018) 30 capsule 1       No Known Allergies    Review of Systems:  -Skin: As above in HPI. No additional skin concerns.  -Const: The patient is generally feeling well today.     Physical exam:  Vitals: There were no vitals taken for this visit.  GEN: This is a well developed, well-nourished male in no acute distress, in a pleasant mood.    SKIN: Acne exam, which includes the face, neck, upper central chest, and upper central back was performed.  -Acneiform depressed rolling, boxcar scars on the bilateral cheeks.   -Inflammatory papules scattered to the face, approx. 7 total  -No other lesions of concern on areas examined.       Impression/Plan:  1. Acne vulgaris, inflammatory w/ acne scarring s/p Accutane X2 with improvement however not clear, s/p doxycycline, minocycyline, Cephalix, Bactrim, s/p benzoyl peroxide, Differin, Epiduo, Benzaclin    Restart Benzaclin gel. Apply to the face in the morning.     Restart Tretinoin 0.05% gel. Apply once daily at night. Retinoid ed reviewed.     Follow-up as needed for new or changing lesions.      Staff Involved:  Scribe/Staff    Scribe  Disclosure  I, Agustin Vallejo, am serving as a scribe to document services personally performed by  Karena Murdock PA-C, based on data collection and the provider's statements to me.     Provider Disclosure:   The documentation recorded by the scribe accurately reflects the services I personally performed and the decisions made by me.    All risks, benefits and alternatives were discussed with patient.  Patient is in agreement and understands the assessment and plan.  All questions were answered.  Sun Screen Education was given.   Return to Clinic  as needed.   Karena Murdock PA-C   [facility]

## 2018-06-27 NOTE — NURSING NOTE
Dermatology Rooming Note    Neil Culp's goals for this visit include:   Chief Complaint   Patient presents with     Derm Problem     Neil is here for a acne follow up, states it's been good.        Marine Perez LPN

## 2018-09-04 ENCOUNTER — OFFICE VISIT (OUTPATIENT)
Dept: INTERNAL MEDICINE | Facility: CLINIC | Age: 23
End: 2018-09-04
Payer: COMMERCIAL

## 2018-09-04 VITALS
SYSTOLIC BLOOD PRESSURE: 102 MMHG | HEART RATE: 67 BPM | WEIGHT: 151.2 LBS | BODY MASS INDEX: 20.51 KG/M2 | OXYGEN SATURATION: 99 % | DIASTOLIC BLOOD PRESSURE: 67 MMHG

## 2018-09-04 DIAGNOSIS — Z00.00 ENCOUNTER FOR ROUTINE ADULT HEALTH EXAMINATION WITHOUT ABNORMAL FINDINGS: Primary | ICD-10-CM

## 2018-09-04 DIAGNOSIS — B00.1 HERPES LABIALIS: ICD-10-CM

## 2018-09-04 DIAGNOSIS — L70.0 ACNE VULGARIS: ICD-10-CM

## 2018-09-04 RX ORDER — CLINDAMYCIN AND BENZOYL PEROXIDE 10; 50 MG/G; MG/G
GEL TOPICAL
Qty: 50 G | Refills: 11 | Status: SHIPPED | OUTPATIENT
Start: 2018-09-04

## 2018-09-04 RX ORDER — VALACYCLOVIR HYDROCHLORIDE 500 MG/1
500 TABLET, FILM COATED ORAL DAILY
Qty: 90 TABLET | Refills: 3 | Status: SHIPPED | OUTPATIENT
Start: 2018-09-04

## 2018-09-04 ASSESSMENT — ENCOUNTER SYMPTOMS
POSTURAL DYSPNEA: 0
SHORTNESS OF BREATH: 1
DYSPNEA ON EXERTION: 0
SPUTUM PRODUCTION: 0
WHEEZING: 0
SNORES LOUDLY: 0
COUGH: 0
COUGH DISTURBING SLEEP: 0
HEMOPTYSIS: 0

## 2018-09-04 ASSESSMENT — PAIN SCALES - GENERAL: PAINLEVEL: NO PAIN (0)

## 2018-09-04 NOTE — MR AVS SNAPSHOT
After Visit Summary   9/4/2018    Neil Culp    MRN: [id number]           Patient Information     Date Of Birth          1995        Visit Information        Provider Department      9/4/2018 7:45 AM Silvestre Garcia MD [facility]        Today's Diagnoses     Encounter for routine adult health examination without abnormal findings    -  1    Acne vulgaris        Herpes labialis          Care Instructions    Primary Care Center Phone Number [phone]  Primary Care Center Medication Refill Request Information:  * Please contact your pharmacy regarding ANY request for medication refills.  ** [facility] Prescription Fax = [phone]  * Please allow 3 business days for routine medication refills.  * Please allow 5 business days for controlled substance medication refills.     Primary Care Center Test Result notification information:  *You will be notified with in 7-10 days of your appointment day regarding the results of your test.  If you are on MyChart you will be notified as soon as the provider has reviewed the results and signed off on them.                 [facility]         Internal Medicine Resident                   Continuity Clinic    Who We Are    Resident Continuity Clinic is a part of the [facility].  Resident physicians see patients independently and establish a relationship with them over the course of their three-year residency program.  As with the Primary Care Clinic, our Resident Continuity Clinic models a group practice.  If your doctor is not available, you will be able to see another resident physician.  At the end of a resident s training, patients will be transitioned to a new resident physician for ongoing care.     We treat patients with a wide array of medical needs from routine physicals, to acute illnesses, to diabetes and blood pressure management, to complex medical illness.  What is a Resident  Physician?    Resident physicians hold medical degrees and are doctors. They are training to become specialists in Internal Medicine. They work under the supervision of board-certified faculty physicians.  Expectations for Your Care    We strive to provide accessible, quality care at all times.    In order to provide this care, it is best to see your primary care resident doctor consistently rather switching between providers.  In the event you do see another physician, you should schedule a follow-up visit with your usual primary care doctor.    If you are transitioning your care from another clinic, it is helpful to have your records available for your doctor to review.    We do not prescribe controlled substances, such as ADD medications or narcotic pain medications, on your first visit.  We will review your health records and concerns prior to devising a treatment plan with you in order to provide the best care.      Clinic Services     Extended clinic hours; patient  to help navigate your visit;  parking; laboratory and imaging services with evening and weekend hours    Multiple medical and surgical specialties in one building    Complementary services, including Nutrition, Integrative Medicine, Pharmacy consultations, Mental and Behavioral Health, Sports Medicine and Physical Therapy    Thank You    We would like to thank you for choosing the [facility] Internal Medicine Resident Continuity Clinic for your primary care. You are making a priceless contribution to the training of the next generation of health care practitioners.     Contact us at [phone] for appointments or questions.    Resident Clinic Hours are Tuesdays and Thursdays, 7:30am-5:00pm    Residents  Kristin Urias MD   (Female )   Yaa Contreras MD   (Female)   Seng Nguyen MD  (Male)   Jareth Gallardo MD  (Male)   Patrice Garcia MD   (Female)   Yoni Spicer MD  (Male)    Adrian Eng MD  (Male)   Juan  MD Geeta  (Male)   Jareth Luo MD (Male)   Guerrero Monroy MD  (Male)   Zehra Castillo MD (Female)    Sejal Gaston MD (Female)   Tunde Sahu MD  (Male)   Amparo Shepard MD(Female)   Renee Villegas MD  (Female)    Supervising Physicians   MD Melissa Rodgers, MD Jose R Whipple, MD Adeel Godinez, MD Gloria Stockton, MD Mateus Haley MD Heather Thompson Buum, MD Kathleen Watson, MD                    Follow-ups after your visit        Who to contact     Please call your clinic at [phone] to:    Ask questions about your health    Make or cancel appointments    Discuss your medicines    Learn about your test results    Speak to your doctor            Additional Information About Your Visit        [facility] [employer] Information     [employer] gives you secure access to your electronic health record. If you see a primary care provider, you can also send messages to your care team and make appointments. If you have questions, please call your primary care clinic.  If you do not have a primary care provider, please call [phone] and they will assist you.      [employer] is an electronic gateway that provides easy, online access to your medical records. With [employer], you can request a clinic appointment, read your test results, renew a prescription or communicate with your care team.     To access your existing account, please contact your [facility] or call [phone] for assistance.        Care EveryWhere ID     This is your Care EveryWhere ID. This could be used by other organizations to access your [place] medical records  [id number]        Your Vitals Were     Pulse Pulse Oximetry BMI (Body Mass Index)             67 99% 20.51 kg/m2          Blood Pressure from Last 3 Encounters:   09/04/18 102/67   03/03/18 112/71   09/15/17 101/60    Weight from Last 3 Encounters:   09/04/18 68.6 kg (151 lb 3.2 oz)   03/03/18  66.6 kg (146 lb 14.4 oz)   09/01/17 68.5 kg (151 lb)              We Performed the Following     HPV VACCINE (GARDASIL 9), 9 VALENT          Where to get your medicines      These medications were sent to [id number] - [place] - [address]  [address] [facility] [place] [address]     Phone:  [phone]     clindamycin-benzoyl peroxide gel    valACYclovir 500 MG tablet          Primary Care Provider Office Phone # Fax #    Ambrose Byers [phone]       [address] [place] [address] [address]  [facility] [address]        Equal Access to Services     : Hadii aad sudheer hadasho Sokaylen, waaxda lucarolinaadaha, qaybta kaalmada adejerrell, henrry will . [facility] [phone].    ATENCIÓN: Si habla español, tiene a silva disposición servicios gratuitos de asistencia lingüística. [facility] [phone].    We comply with applicable federal civil rights laws and [place] laws. We do not discriminate on the basis of race, color, national origin, age, disability, sex, sexual orientation, or gender identity.            Thank you!     Thank you for choosing [facility] PRIMARY [facility]ARE [facility]IC  for your care. Our goal is always to provide you with excellent care. Hearing back from our patients is one way we can continue to improve our services. Please take a few minutes to complete the written survey that you may receive in the mail after your visit with us. Thank you!             Your Updated Medication List - Protect others around you: Learn how to safely use, store and throw away your medicines at www.disposemymeds.org.          This list is accurate as of 9/4/18  8:45 AM.  Always use your most recent med list.                   Brand Name Dispense Instructions for use Diagnosis    clindamycin-benzoyl peroxide gel    BENZACLIN    50 g    Apply topically daily to the face.    Acne vulgaris       tretinoin 0.05 % cream    RETIN-A    45 g    Spread a pea size amount into affected area  topically at bedtime.  Use sunscreen SPF>20.    Acne vulgaris       valACYclovir 500 MG tablet    VALTREX    90 tablet    Take 1 tablet (500 mg) by mouth daily    Herpes labialis

## 2018-09-04 NOTE — PROGRESS NOTES
PRIMARY CARE CENTER         HPI:       Neil Culp is a 23 year old male with PMH of acne vulgaris presents for his annual physical exam.     No particular health concerns.     No concerns about STD, does not want STD testing. Not sexually active.     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  Classes start at the end of this month.  Going to [place] tomorrow.         ROS:       ROS  I have personally reviewed and updated the ROS on the day of the visit.        Constitutional, neuro, ENT, endocrine, pulmonary, cardiac, gastrointestinal, genitourinary, musculoskeletal, integument and psychiatric systems are negative, except as otherwise noted.    Problem, Medication and Allergy Lists were reviewed and are current.  Patient is an established patient to this clinic and so  I reviewed/updated the Past Medical History, the Family History and the Social History.       Past Medical History:   Diagnosis Date     Acne        No Known Allergies    Past Surgical History:   Procedure Laterality Date     wisdom teeth          Family History   Problem Relation Age of Onset     Prostate Cancer Maternal Grandfather      Breast Cancer Mother      Diabetes Maternal Grandmother      Hypertension Maternal Grandmother      Psychotic Disorder Father      Bipolar     Retinal detachment Brother      possible     Glaucoma No family hx of      Macular Degeneration No family hx of      Cancer No family hx of      No family history of skin cancer     Melanoma No family hx of      Skin Cancer No family hx of        Social History     Social History     Marital status: Single     Spouse name: N/A     Number of children: N/A     Years of education: N/A     Occupational History     Not on file.     Social History Main Topics     Smoking status: Never Smoker     Smokeless tobacco: Never Used     Alcohol use Yes     Drug use: No     Sexual activity: No     Other Topics Concern     Not on file     Social  History Narrative       Current Outpatient Prescriptions   Medication     clindamycin-benzoyl peroxide (BENZACLIN) gel     tretinoin (RETIN-A) 0.05 % cream     valACYclovir (VALTREX) 500 MG tablet     [DISCONTINUED] valACYclovir (VALTREX) 500 MG tablet     No current facility-administered medications for this visit.               Physical Exam:   /67  Pulse 67  Wt 68.6 kg (151 lb 3.2 oz)  SpO2 99%  BMI 20.51 kg/m2  Body mass index is 20.51 kg/(m^2).  Vitals were reviewed       GENERAL APPEARANCE: healthy, alert and no distress     EYES: EOMI,  PERRL     HENT: ear canals and TM's normal and nose and mouth without ulcers or lesions     NECK: no adenopathy, no asymmetry, masses, or scars and thyroid normal to palpation     RESP: lungs clear to auscultation - no rales, rhonchi or wheezes     CV: regular rates and rhythm, normal S1 S2, no S3 or S4 and no murmur, click or rub     ABDOMEN:  soft, nontender, no HSM or masses and bowel sounds normal     MS: extremities normal- no gross deformities noted, no evidence of inflammation in joints, FROM in all extremities.     SKIN: cystic acne on face      NEURO: Normal strength and tone, sensory exam grossly normal, mentation intact and speech normal     PSYCH: mentation appears normal. and affect normal/bright     LYMPHATICS: No cervical adenopathy        Results:     N/a    Assessment and Plan     Neil was seen today for physical.    Diagnoses and all orders for this visit:    Encounter for routine adult health examination without abnormal findings.  Patient due for second out of third HPV vaccine today. Otherwise no acute health concerns. Defers HIV testing or STD screening at this time.   -     HPV VACCINE (GARDASIL 9), 9 VALENT    Acne vulgaris  -     clindamycin-benzoyl peroxide (BENZACLIN) gel; Apply topically daily to the face.    History of herpes labialis  Will refill in case he needs it again for a recurrence.  Has not had a recurrence in several years  now.   -     valACYclovir (VALTREX) 500 MG tablet; Take 1 tablet (500 mg) by mouth daily    Options for treatment and follow-up care were reviewed with the patient. Neil Culp engaged in the decision making process and verbalized understanding of the options discussed and agreed with the final plan.    Patrice Garcia MD PhD  PGY-2, Internal Medicine  [phone]   Sep 4, 2018    Plan of care discussed with Dr. Hawkins.     Teaching Physician Note:    While the patient was in clinic, I reviewed the patient's medical history and results, the resident's findings on physical examination, and the patient's diagnosis and treatment plan with the resident.  I agree with the information documented with the following exceptions: none.    Lubna Hawkins M.D.  Internal Medicine  Primary Care Center   pager [phone]

## 2018-09-04 NOTE — PATIENT INSTRUCTIONS
Primary Care Center Phone Number [phone]  Primary Care Center Medication Refill Request Information:  * Please contact your pharmacy regarding ANY request for medication refills.  ** [facility] Prescription Fax = [phone]  * Please allow 3 business days for routine medication refills.  * Please allow 5 business days for controlled substance medication refills.     Primary Care Center Test Result notification information:  *You will be notified with in 7-10 days of your appointment day regarding the results of your test.  If you are on MyChart you will be notified as soon as the provider has reviewed the results and signed off on them.                 [facility]         Internal Medicine Resident                   Continuity Clinic    Who We Are    Resident Continuity Clinic is a part of the [facility] Primary Care Clinic.  Resident physicians see patients independently and establish a relationship with them over the course of their three-year residency program.  As with the Primary Care Clinic, our Resident Continuity Clinic models a group practice.  If your doctor is not available, you will be able to see another resident physician.  At the end of a resident s training, patients will be transitioned to a new resident physician for ongoing care.     We treat patients with a wide array of medical needs from routine physicals, to acute illnesses, to diabetes and blood pressure management, to complex medical illness.  What is a Resident Physician?    Resident physicians hold medical degrees and are doctors. They are training to become specialists in Internal Medicine. They work under the supervision of board-certified faculty physicians.  Expectations for Your Care    We strive to provide accessible, quality care at all times.    In order to provide this care, it is best to see your primary care resident doctor consistently rather switching between providers.  In the event you do see another physician, you  should schedule a follow-up visit with your usual primary care doctor.    If you are transitioning your care from another clinic, it is helpful to have your records available for your doctor to review.    We do not prescribe controlled substances, such as ADD medications or narcotic pain medications, on your first visit.  We will review your health records and concerns prior to devising a treatment plan with you in order to provide the best care.      Clinic Services     Extended clinic hours; patient  to help navigate your visit;  parking; laboratory and imaging services with evening and weekend hours    Multiple medical and surgical specialties in one building    Complementary services, including Nutrition, Integrative Medicine, Pharmacy consultations, Mental and Behavioral Health, Sports Medicine and Physical Therapy    Thank You    We would like to thank you for choosing the [facility] Internal Medicine Resident Continuity Clinic for your primary care. You are making a priceless contribution to the training of the next generation of health care practitioners.     Contact us at [phone] for appointments or questions.    Resident Clinic Hours are Tuesdays and Thursdays, 7:30am-5:00pm    Residents  Kristin Urias MD   (Female )   Yaa Contreras MD   (Female)   Seng Nguyen MD  (Male)   Jareth Gallardo MD  (Male)   Patrice Garcia MD   (Female)   Yoni Spicer MD  (Male)    Adrian Eng MD  (Male)   Juan Connor MD  (Male)   Jareth Luo MD (Male)   Guerrero Monroy MD  (Male)   Zehra Castillo MD (Female)    Sejal Gaston MD (Female)   Tunde Sahu MD  (Male)   Amparo Shepard MD(Female)   Renee Villegas MD  (Female)    Supervising Physicians   MD Melissa Rodgers MD Briar Duffy, MD James Langland, MD Mary Logeais, MD Tanya Melnik, MD Charles Moldow, MD Heather Thompson Buum, MD Kathleen Watson, MD

## 2019-09-30 ENCOUNTER — HEALTH MAINTENANCE LETTER (OUTPATIENT)
Age: 24
End: 2019-09-30

## 2019-10-28 ENCOUNTER — HEALTH MAINTENANCE LETTER (OUTPATIENT)
Age: 24
End: 2019-10-28

## 2020-01-02 ENCOUNTER — OFFICE VISIT (OUTPATIENT)
Dept: OPHTHALMOLOGY | Facility: CLINIC | Age: 25
End: 2020-01-02
Payer: COMMERCIAL

## 2020-01-02 DIAGNOSIS — H52.13 MYOPIA OF BOTH EYES: Primary | ICD-10-CM

## 2020-01-02 ASSESSMENT — TONOMETRY
OD_IOP_MMHG: 11
IOP_METHOD: ICARE
OS_IOP_MMHG: 09

## 2020-01-02 ASSESSMENT — REFRACTION_WEARINGRX
OS_SPHERE: -3.50
OD_CYLINDER: +0.50
OD_SPHERE: -5.00
OD_AXIS: 137
SPECS_TYPE: SVL
OS_CYLINDER: SPHERE

## 2020-01-02 ASSESSMENT — EXTERNAL EXAM - LEFT EYE: OS_EXAM: NORMAL

## 2020-01-02 ASSESSMENT — REFRACTION_MANIFEST
OD_CYLINDER: +0.50
OD_AXIS: 137
OD_SPHERE: -4.25
OS_CYLINDER: SPHERE
OS_SPHERE: -3.50

## 2020-01-02 ASSESSMENT — VISUAL ACUITY
CORRECTION_TYPE: GLASSES
METHOD: SNELLEN - LINEAR
OS_CC: 20/20
OD_CC: 20/20

## 2020-01-02 ASSESSMENT — CONF VISUAL FIELD
OS_NORMAL: 1
OD_NORMAL: 1
METHOD: COUNTING FINGERS

## 2020-01-02 ASSESSMENT — EXTERNAL EXAM - RIGHT EYE: OD_EXAM: NORMAL

## 2020-01-02 ASSESSMENT — SLIT LAMP EXAM - LIDS
COMMENTS: NORMAL
COMMENTS: NORMAL

## 2020-01-02 ASSESSMENT — CUP TO DISC RATIO
OS_RATIO: 0.45
OD_RATIO: 0.45

## 2020-01-02 NOTE — NURSING NOTE
"Chief Complaints and History of Present Illnesses   Patient presents with     COMPREHENSIVE EYE EXAM     Chief Complaint(s) and History of Present Illness(es)     COMPREHENSIVE EYE EXAM     Laterality: both eyes    Associated symptoms: Negative for dryness, eye pain, flashes and floaters    Response to treatment: no improvement              Comments     Patient states he is here today for comprehensive exam today, notes that he rarely wears CTL, doesn't want a CTL exam today due to \"I have enough to get by\"    Nanette SOTELO January 2, 2020 12:42 PM                  "

## 2020-01-02 NOTE — PROGRESS NOTES
A/P  1.) Myopia each eye  -Doing well in current Rx, continue  -Part-time CL wearer, not eval'd today  -Dilated ocular health unremarkable each eye  -Reviewed flashes/floaters and need for stat eye eval should they occur    Monitor 1-2 years routine, sooner prn    I have confirmed the patient's CC, HPI and reviewed Past Medical History, Past Surgical History, Social History, Family History, Problem List, Medication List and agree with Tech note.     Francine Campuzano, OD [facility]

## 2021-01-15 ENCOUNTER — HEALTH MAINTENANCE LETTER (OUTPATIENT)
Age: 26
End: 2021-01-15

## 2021-02-08 NOTE — TELEPHONE ENCOUNTER
MEDICAL RECORDS REQUEST   [facility]  Urology Clinic  [address] [place] [address]  PHONE: [phone]  Fax: [phone]        FUTURE VISIT INFORMATION                                                   Neil Culp, : 1995 scheduled for future visit at [facility]    APPOINTMENT INFORMATION:    Date: 2021    Provider:  Ramirez GORMAN    Reason for Visit/Diagnosis: Testicular pain or possible kidney stones ?     REFERRAL INFORMATION:    Referring provider:  Self    Specialty: N/A    Referring providers clinic:  N/A    Clinic contact number:  N/A    RECORDS REQUESTED FOR VISIT                                                     NOTES  STATUS/DETAILS   OFFICE NOTE from referring provider  No   OFFICE NOTE from other specialist  no   DISCHARGE SUMMARY from hospital  no   DISCHARGE REPORT from the ER  no   OPERATIVE REPORT  no   MEDICATION LIST  no     PRE-VISIT CHECKLIST      Record collection complete YES- Pt states he has not had any imaging done, and there are no related recs. Per pt he did go to urgent care but they did not do anything for him -    If no, please explain: CSS called pt, VM is full will call another day to inquire on recs -     Appointment appropriately scheduled           (right time/right provider) Yes   MyChart activation Yes   Questionnaire complete If no, please explain: In process      Completed by: Caron Gaviria

## 2021-02-10 ENCOUNTER — PRE VISIT (OUTPATIENT)
Dept: UROLOGY | Facility: CLINIC | Age: 26
End: 2021-02-10

## 2021-02-24 ENCOUNTER — PRE VISIT (OUTPATIENT)
Dept: UROLOGY | Facility: CLINIC | Age: 26
End: 2021-02-24

## 2021-02-24 ENCOUNTER — VIRTUAL VISIT (OUTPATIENT)
Dept: UROLOGY | Facility: CLINIC | Age: 26
End: 2021-02-24
Payer: COMMERCIAL

## 2021-02-24 DIAGNOSIS — Z87.442 HISTORY OF NEPHROLITHIASIS: Primary | ICD-10-CM

## 2021-02-24 PROCEDURE — 99203 OFFICE O/P NEW LOW 30 MIN: CPT | Mod: 95 | Performed by: PHYSICIAN ASSISTANT

## 2021-02-24 NOTE — LETTER
2/24/2021       RE: Neil Culp  [address] [facility] [address]     Dear Colleague,    Thank you for referring your patient, Neil Culp, to the [facility]OLOG[facility] [place] at [facility]. Please see a copy of my visit note below.    Video Visit Technology for this patient: [employer]SPORT Video Visit- Patient was left in waiting room    Neil is a 26 year old who is being evaluated via a billable video visit.      How would you like to obtain your AVS? MyChart  If the video visit is dropped, the invitation should be resent by: Text to cell phone: [phone]  Will anyone else be joining your video visit? No      Left testicular pain, urethral pain  Pain resolved, lasted 3-4 days  No hematuria      Name: Neil Culp    MRN: [id number]   YOB: 1995                Assessment and Plan:   26 year old male with a history of a 4 mm left UVJ stone in 2017 (never confirmed to have passed), and recent episode of self-limited left-sided testicular pain and urethral discomfort, now resolved. He has had no further gross hematuria and UA on 1/30/2021 was negative for microhematuria or infection. Possible that his recent scrotal and urethral symptoms are not related to urinary stone, though given his history of a left UVJ stone that was never confirmed to have passed, recommend CT stone protocol to further evaluate. He is amenable and would like to proceed. Will also check renal panel to assess kidney function given the proteinuria and history of nephrolithiasis.  -CT abdomen/pelvis w/o contrast next available.  -Serum creatinine.  -Discussed dietary modifications for kidney stone prevention.     Follow up pending results of CT and blood work.     Chitra Guzmán PA-C  February 24, 2021          Chief Complaint:   Testicular pain          History of Present Illness:   Neil Braga  Suni is a 26 year old male seen today via virtual visit for evaluation of testicular pain. On 1/29/2021, he developed left-sided testicular pain and urethral discomfort. This lasted for 3-4 days and then resolved. He was seen through outside urgent care for these symptoms on 1/30/2021 where exam was normal and UA was negative aside from concentrated urine and proteinuria. Urine culture grew <10K mixed  reyna. Today, he reports no further testicular or urethral pain, though decided to keep the appointment to check things out.    Of note, I previously saw him on 9/15/2017 for a history of gross hematuria. CT scan on 9/18/17 demonstrated a 4 mm left UVJ stone. He was prescribed Flomax, instructed to strain his urine, and repeat KUB x-ray on 10/25/17 did not clearly visualize a stone in the area of the left UVJ. It was recommended to check another urinalysis and follow up to discuss possible repeat CT scan if he never saw the stone pass in his urine. He was then lost to follow up for several years. Today, he notes that he never saw a stone pass in his urine after our last visit. Has not had any further gross hematuria.          Past Medical History:     Past Medical History:   Diagnosis Date     Acne             Past Surgical History:     Past Surgical History:   Procedure Laterality Date     wisdom teeth               Social History:     Social History     Tobacco Use     Smoking status: Never Smoker     Smokeless tobacco: Never Used   Substance Use Topics     Alcohol use: Yes            Family History:     Family History   Problem Relation Age of Onset     Prostate Cancer Maternal Grandfather      Breast Cancer Mother      Diabetes Maternal Grandmother      Hypertension Maternal Grandmother      Psychotic Disorder Father         Bipolar     Retinal detachment Brother         possible     Glaucoma No family hx of      Macular Degeneration No family hx of      Cancer No family hx of         No family history of  skin cancer     Melanoma No family hx of      Skin Cancer No family hx of               Allergies:   No Known Allergies         Medications:     Current Outpatient Medications   Medication Sig     clindamycin-benzoyl peroxide (BENZACLIN) gel Apply topically daily to the face. (Patient not taking: Reported on 2/24/2021)     tretinoin (RETIN-A) 0.05 % cream Spread a pea size amount into affected area topically at bedtime.  Use sunscreen SPF>20. (Patient not taking: Reported on 2/24/2021)     valACYclovir (VALTREX) 500 MG tablet Take 1 tablet (500 mg) by mouth daily (Patient not taking: Reported on 2/24/2021)     No current facility-administered medications for this visit.              Review of Systems:    ROS: 14 point ROS neg other than the symptoms noted above in the HPI and PMH.          Physical Exam:   GENERAL: Healthy, alert and no distress  EYES: Eyes grossly normal to inspection.  No discharge or erythema, or obvious scleral/conjunctival abnormalities.  RESP: No audible wheeze, cough, or visible cyanosis.  No visible retractions or increased work of breathing.    SKIN: Visible skin clear. No significant rash, abnormal pigmentation or lesions.  NEURO: Cranial nerves grossly intact.  Mentation and speech appropriate for age.  PSYCH: Mentation appears normal, affect normal/bright, judgement and insight intact, normal speech and appearance well-groomed.        Labs:      Creatinine   Date Value Ref Range Status   09/15/2017 0.98 0.66 - 1.25 mg/dL Final       1/30/2021 - UA: sp gr >1.030, 100 protein, o/w negative --> UC: <10K mixed  reyna      Imaging:    CT abdomen pelvis with contrast 9/18/2017 4:36 PM.  FINDINGS: Lung bases are clear. Heart size is normal. No pericardial  effusion. No pleural effusions.     4 mm hyperdense stone within the bladder at the left ureterovesical  junction (series 3, image 374). There is minimal dilatation of the  distal left ureter and left pelviectasis. No focal renal lesions.  No  filling defects within bilateral pyelocalyceal system and ureters. No  filling defects within the urinary bladder.     No focal hepatic lesions. The spleen measurements are at the upper  limits of normal. The adrenals, gallbladder, and pancreas are  unremarkable. Unremarkable prostate. Normal appendix. Colon and small  bowel are nondistended. No free fluid. No free air. Patent abdominal  vasculature. No abdominal aortic aneurysm. No significant  retroperitoneal or mesenteric lymphadenopathy.     No suspicious bony lesions. Few scattered Schmorl's nodes indentations  within the visualized thoracolumbar spine.                                                                      IMPRESSION: 4 mm stone within the left ureterovesical junction, with  minimal associated left hydroureter and pelviectasis.      XR KUB  10/25/2017   FINDINGS: Single supine view of the abdomen. No hyperdensities  overlying the renal collecting systems to suggest  nephroureterolithiasis. Nonobstructive bowel gas pattern. No  pneumatosis. No acute osseous abnormalities.                                                                      IMPRESSION: No hyperdensities overlying the renal collecting system to  suggest nephroureterolithiasis.             Video Start Time: 10:45 AM  Video-Visit Details    Type of service:  Video Visit    Video End Time:11:01 AM    Originating Location (pt. Location): Home    Distant Location (provider location):  [facility] [place]     Platform used for Video Visit: [employer]

## 2021-02-24 NOTE — NURSING NOTE
Chief Complaint   Patient presents with     Consult     testicular pain       Patient Active Problem List   Diagnosis     Congenital pectus carinatum     Acne     Encounter for long-term current use of medication     Nodular acne     Myopia     Wart       No Known Allergies    Current Outpatient Medications   Medication Sig Dispense Refill     clindamycin-benzoyl peroxide (BENZACLIN) gel Apply topically daily to the face. (Patient not taking: Reported on 2/24/2021) 50 g 11     tretinoin (RETIN-A) 0.05 % cream Spread a pea size amount into affected area topically at bedtime.  Use sunscreen SPF>20. (Patient not taking: Reported on 2/24/2021) 45 g 11     valACYclovir (VALTREX) 500 MG tablet Take 1 tablet (500 mg) by mouth daily (Patient not taking: Reported on 2/24/2021) 90 tablet 3       Social History     Tobacco Use     Smoking status: Never Smoker     Smokeless tobacco: Never Used   Substance Use Topics     Alcohol use: Yes     Drug use: No       Kristin Oliva LPN  2/24/2021  10:26 AM

## 2021-02-24 NOTE — PROGRESS NOTES
Video Visit Technology for this patient: [employer] Video Visit- Patient was left in waiting room    Neil is a 26 year old who is being evaluated via a billable video visit.      How would you like to obtain your AVS? MyChart  If the video visit is dropped, the invitation should be resent by: Text to cell phone: [phone]  Will anyone else be joining your video visit? No      Left testicular pain, urethral pain  Pain resolved, lasted 3-4 days  No hematuria      Name: Neil Culp    MRN: [id number]   YOB: 1995                Assessment and Plan:   26 year old male with a history of a 4 mm left UVJ stone in 2017 (never confirmed to have passed), and recent episode of self-limited left-sided testicular pain and urethral discomfort, now resolved. He has had no further gross hematuria and UA on 1/30/2021 was negative for microhematuria or infection. Possible that his recent scrotal and urethral symptoms are not related to urinary stone, though given his history of a left UVJ stone that was never confirmed to have passed, recommend CT stone protocol to further evaluate. He is amenable and would like to proceed. Will also check renal panel to assess kidney function given the proteinuria and history of nephrolithiasis.  -CT abdomen/pelvis w/o contrast next available.  -Serum creatinine.  -Discussed dietary modifications for kidney stone prevention.     Follow up pending results of CT and blood work.     Chitra Guzmán PA-C  February 24, 2021          Chief Complaint:   Testicular pain          History of Present Illness:   Neil Culp is a 26 year old male seen today via virtual visit for evaluation of testicular pain. On 1/29/2021, he developed left-sided testicular pain and urethral discomfort. This lasted for 3-4 days and then resolved. He was seen through outside urgent care for these symptoms on 1/30/2021 where exam was normal and UA was negative aside from  concentrated urine and proteinuria. Urine culture grew <10K mixed  reyna. Today, he reports no further testicular or urethral pain, though decided to keep the appointment to check things out.    Of note, I previously saw him on 9/15/2017 for a history of gross hematuria. CT scan on 9/18/17 demonstrated a 4 mm left UVJ stone. He was prescribed Flomax, instructed to strain his urine, and repeat KUB x-ray on 10/25/17 did not clearly visualize a stone in the area of the left UVJ. It was recommended to check another urinalysis and follow up to discuss possible repeat CT scan if he never saw the stone pass in his urine. He was then lost to follow up for several years. Today, he notes that he never saw a stone pass in his urine after our last visit. Has not had any further gross hematuria.          Past Medical History:     Past Medical History:   Diagnosis Date     Acne             Past Surgical History:     Past Surgical History:   Procedure Laterality Date     wisdom teeth               Social History:     Social History     Tobacco Use     Smoking status: Never Smoker     Smokeless tobacco: Never Used   Substance Use Topics     Alcohol use: Yes            Family History:     Family History   Problem Relation Age of Onset     Prostate Cancer Maternal Grandfather      Breast Cancer Mother      Diabetes Maternal Grandmother      Hypertension Maternal Grandmother      Psychotic Disorder Father         Bipolar     Retinal detachment Brother         possible     Glaucoma No family hx of      Macular Degeneration No family hx of      Cancer No family hx of         No family history of skin cancer     Melanoma No family hx of      Skin Cancer No family hx of               Allergies:   No Known Allergies         Medications:     Current Outpatient Medications   Medication Sig     clindamycin-benzoyl peroxide (BENZACLIN) gel Apply topically daily to the face. (Patient not taking: Reported on 2/24/2021)     tretinoin (RETIN-A)  0.05 % cream Spread a pea size amount into affected area topically at bedtime.  Use sunscreen SPF>20. (Patient not taking: Reported on 2/24/2021)     valACYclovir (VALTREX) 500 MG tablet Take 1 tablet (500 mg) by mouth daily (Patient not taking: Reported on 2/24/2021)     No current facility-administered medications for this visit.              Review of Systems:    ROS: 14 point ROS neg other than the symptoms noted above in the HPI and PMH.          Physical Exam:   GENERAL: Healthy, alert and no distress  EYES: Eyes grossly normal to inspection.  No discharge or erythema, or obvious scleral/conjunctival abnormalities.  RESP: No audible wheeze, cough, or visible cyanosis.  No visible retractions or increased work of breathing.    SKIN: Visible skin clear. No significant rash, abnormal pigmentation or lesions.  NEURO: Cranial nerves grossly intact.  Mentation and speech appropriate for age.  PSYCH: Mentation appears normal, affect normal/bright, judgement and insight intact, normal speech and appearance well-groomed.        Labs:      Creatinine   Date Value Ref Range Status   09/15/2017 0.98 0.66 - 1.25 mg/dL Final       1/30/2021 - UA: sp gr >1.030, 100 protein, o/w negative --> UC: <10K mixed  reyna      Imaging:    CT abdomen pelvis with contrast 9/18/2017 4:36 PM.  FINDINGS: Lung bases are clear. Heart size is normal. No pericardial  effusion. No pleural effusions.     4 mm hyperdense stone within the bladder at the left ureterovesical  junction (series 3, image 374). There is minimal dilatation of the  distal left ureter and left pelviectasis. No focal renal lesions. No  filling defects within bilateral pyelocalyceal system and ureters. No  filling defects within the urinary bladder.     No focal hepatic lesions. The spleen measurements are at the upper  limits of normal. The adrenals, gallbladder, and pancreas are  unremarkable. Unremarkable prostate. Normal appendix. Colon and small  bowel are  nondistended. No free fluid. No free air. Patent abdominal  vasculature. No abdominal aortic aneurysm. No significant  retroperitoneal or mesenteric lymphadenopathy.     No suspicious bony lesions. Few scattered Schmorl's nodes indentations  within the visualized thoracolumbar spine.                                                                      IMPRESSION: 4 mm stone within the left ureterovesical junction, with  minimal associated left hydroureter and pelviectasis.      XR KUB  10/25/2017   FINDINGS: Single supine view of the abdomen. No hyperdensities  overlying the renal collecting systems to suggest  nephroureterolithiasis. Nonobstructive bowel gas pattern. No  pneumatosis. No acute osseous abnormalities.                                                                      IMPRESSION: No hyperdensities overlying the renal collecting system to  suggest nephroureterolithiasis.             Video Start Time: 10:45 AM  Video-Visit Details    Type of service:  Video Visit    Video End Time:11:01 AM    Originating Location (pt. Location): Home    Distant Location (provider location):  [facility] [place]     Platform used for Video Visit: [employer]

## 2021-02-24 NOTE — PATIENT INSTRUCTIONS
UROLOGY CLINIC VISIT PATIENT INSTRUCTIONS    Follow up for blood work to check kidney function and a CT scan to confirm passage of the previously seen left-sided kidney stone. Will contact you with results via [employer].    Dietary Measures for Stone Prevention:  - Drink plenty of fluids.  It is recommended that you drink at least 3 liters (100 ounces) per day, with a goal of making at least 2 liters (66 ounces) per day of urine.   - If alcoholic or caffeinated beverages are consumed then you need to drink water along with these beverages to maintain hydration.    - A few ounces of lemon juice concentrate can be diluted in your water each day to help prevent stones.    - You should limit intake of red meat, salt, and salty processed foods.    - Please maintain calcium intake in your diet through continued consumption of dairy products.   - If your stone is composed of oxalate, you should limit foods that are high in oxalate such as spinach, sweet potatoes, dark chocolate, soy products, and some nuts such as peanuts.        If you have any issues, questions or concerns in the meantime, do not hesitate to contact us at [phone] or via [employer].     It was a pleasure meeting with you today.  Thank you for allowing me and my team the privilege of caring for you today.  YOU are the reason we are here, and I truly hope we provided you with the excellent service you deserve.  Please let us know if there is anything else we can do for you so that we can be sure you are leaving completely satisfied with your care experience.

## 2021-02-25 ENCOUNTER — TELEPHONE (OUTPATIENT)
Dept: UROLOGY | Facility: CLINIC | Age: 26
End: 2021-02-25

## 2021-03-30 ENCOUNTER — NURSE TRIAGE (OUTPATIENT)
Dept: NURSING | Facility: CLINIC | Age: 26
End: 2021-03-30

## 2021-03-30 NOTE — TELEPHONE ENCOUNTER
Triage Call: Wants the covid vaccine.    While the state has opened eligibility to all people, our health system will continue to prioritize those groups who are most at risk of exposure to COVID-19 and/or hospitalization due to COVID-19. Once a larger percentage of these groups are vaccinated, we will open vaccine appointments to a larger group.      We are working hard to begin vaccinating more people against COVID-19. Beginning Wednesday, March 31, we are vaccinating:     Individuals age 50 and older.    All healthcare workers, both paid and unpaid.     People age 16 or 18-49 years with certain medical conditions or disabilities listed in Phase 1b tier 4.    People who identify as one or more of the following high-risk groups: Black/, /[place] Native, Southeast , /, and /.     If you fit into one of these categories, please log in to [employer] using this link to see if we have an open appointment and schedule an appointment.      If there are no appointments left, you will be unable to schedule.   If you have technical difficulty using [employer], call [phone] for assistance.      As vaccine supply increases and we are able to open appointments to more groups, we will share that information on our website:  [email or web address]. Check this website to stay up to date on COVID-19 vaccination information.    Jennifer Bryan RN Nursing Advisor 3/30/2021 3:16 PM     Reason for Disposition    COVID-19 vaccine, Frequently Asked Questions (FAQs)    Additional Information    Negative: [1] Difficulty breathing or swallowing AND [2] starts within 2 hours after injection    Negative: Sounds like a life-threatening emergency to the triager    Negative: [1] COVID-19 exposure AND [2] no symptoms    Negative: [1] Typical COVID-19 symptoms AND [2] symptoms that are NOT expected from vaccine (e.g., cough, difficulty breathing, loss  of taste or smell, runny nose, sore throat)    Negative: [1] Typical COVID-19 symptoms AND [2] started > 3 days after getting vaccine    Negative: Fever > 104 F (40 C)    Negative: Sounds like a severe, unusual reaction to the triager    Negative: [1] Redness or red streak around the injection site AND [2] started > 48 hours after getting vaccine AND [3] fever    Negative: [1] Fever > 101 F (38.3 C) AND [2] age > 60 AND [3] started > 48 hours after getting vaccine    Negative: [1] Fever > 100.0 F (37.8 C) AND [2] bedridden (e.g., nursing home patient, CVA, chronic illness, recovering from surgery) AND [3] started > 48 hours after getting vaccine    Negative: [1] Fever > 100.0 F (37.8 C) AND [2] diabetes mellitus or weak immune system (e.g., HIV positive, cancer chemo, splenectomy, organ transplant, chronic steroids) AND [3] started > 48 hours after getting vaccine    Negative: [1] Fever > 100.0 F (37.8 C) AND [2] healthcare worker    Negative: [1] Redness or red streak around the injection site AND [2] started > 48 hours after getting vaccine AND [3] no fever  (Exception: red area < 1 inch or 2.5 cm wide)    Negative: [1] Pain, tenderness, or swelling at the injection site AND [2] over 3 days (72 hours) since vaccine AND [3] getting worse    Negative: Fever > 100.0 F (37.8 C) present > 3 days (72 hours)    Negative: [1] Requesting COVID-19 vaccine AND [2] healthcare worker (e.g., EMS first responders, doctors, nurses)    Negative: [1] Requesting COVID-19 vaccine AND [2] resident of a long-term care facility (e.g., nursing home)    Negative: [1] Requesting COVID-19 vaccine AND [2] vaccine available in the community for this patient group    Negative: COVID-19 vaccine, injection site reaction (e.g., pain, redness, swelling), question about    Negative: COVID-19 vaccine, systemic reactions (e.g., fatigue, fever, muscle aches), questions about    Protocols used: CORONAVIRUS (COVID-19) VACCINE QUESTIONS AND  [id number]-[id number] 1.3

## 2021-10-24 ENCOUNTER — HEALTH MAINTENANCE LETTER (OUTPATIENT)
Age: 26
End: 2021-10-24

## 2022-02-13 ENCOUNTER — HEALTH MAINTENANCE LETTER (OUTPATIENT)
Age: 27
End: 2022-02-13

## 2022-10-15 ENCOUNTER — HEALTH MAINTENANCE LETTER (OUTPATIENT)
Age: 27
End: 2022-10-15

## 2024-01-07 ENCOUNTER — HEALTH MAINTENANCE LETTER (OUTPATIENT)
Age: 29
End: 2024-01-07